# Patient Record
Sex: FEMALE | Race: OTHER | Employment: FULL TIME | URBAN - METROPOLITAN AREA
[De-identification: names, ages, dates, MRNs, and addresses within clinical notes are randomized per-mention and may not be internally consistent; named-entity substitution may affect disease eponyms.]

---

## 2018-03-07 ENCOUNTER — OFFICE VISIT (OUTPATIENT)
Dept: FAMILY MEDICINE CLINIC | Facility: CLINIC | Age: 43
End: 2018-03-07

## 2018-03-07 VITALS
SYSTOLIC BLOOD PRESSURE: 118 MMHG | OXYGEN SATURATION: 98 % | BODY MASS INDEX: 29.71 KG/M2 | WEIGHT: 174 LBS | HEART RATE: 60 BPM | HEIGHT: 64 IN | RESPIRATION RATE: 16 BRPM | DIASTOLIC BLOOD PRESSURE: 66 MMHG | TEMPERATURE: 98 F

## 2018-03-07 DIAGNOSIS — H00.015 HORDEOLUM EXTERNUM OF LEFT LOWER EYELID: Primary | ICD-10-CM

## 2018-03-07 PROCEDURE — 99202 OFFICE O/P NEW SF 15 MIN: CPT | Performed by: NURSE PRACTITIONER

## 2018-03-07 RX ORDER — ERYTHROMYCIN 5 MG/G
OINTMENT OPHTHALMIC
Qty: 1 G | Refills: 0 | Status: SHIPPED | OUTPATIENT
Start: 2018-03-07 | End: 2018-03-07

## 2018-03-07 RX ORDER — ERYTHROMYCIN 5 MG/G
OINTMENT OPHTHALMIC
Qty: 1 G | Refills: 0 | Status: SHIPPED | OUTPATIENT
Start: 2018-03-07 | End: 2018-08-28

## 2018-03-08 NOTE — PATIENT INSTRUCTIONS
Sty (or Stye)  A sty is an infection of the oil gland of the eyelid. It may develop into a small pocket of pus (an abscess). This can cause pain, redness, and swelling.  In early stages, a sty is treated with antibiotic cream, eye drops, or a small towel © 2020-6210 [employer] [address]. [address] [facility] [place] [facility] [place]. All rights reserved. This information is not intended as a substitute for professional medical care. Always follow your healthcare professional's instructions.

## 2018-03-08 NOTE — PROGRESS NOTES
CHIEF COMPLAINT:   Patient presents with:  Eye Problem: stye on bottom lid for months on L eye, Both eyes sometimes crusty       HPI:   Aftab Ott is a 37year old female who presents with chief complaint of possible sty.   Symptoms began  \"several months 174 lb   SpO2 98%   BMI 29.87 kg/m²   GENERAL: well developed, well nourished, and in no apparent distress  SKIN: no rashes, no suspicious lesions  EYES: PERRLA, EOMI.  +Hordeolum to LT mid lower eyelid, approx. 2 mm and erythematous.   LT conjunctiva non-e

## 2018-08-28 ENCOUNTER — NURSE ONLY (OUTPATIENT)
Dept: FAMILY MEDICINE CLINIC | Facility: CLINIC | Age: 43
End: 2018-08-28
Payer: COMMERCIAL

## 2018-08-28 VITALS
BODY MASS INDEX: 28.85 KG/M2 | SYSTOLIC BLOOD PRESSURE: 122 MMHG | RESPIRATION RATE: 17 BRPM | OXYGEN SATURATION: 98 % | DIASTOLIC BLOOD PRESSURE: 62 MMHG | HEIGHT: 64 IN | WEIGHT: 169 LBS | HEART RATE: 78 BPM

## 2018-08-28 DIAGNOSIS — B37.3 VAGINAL YEAST INFECTION: ICD-10-CM

## 2018-08-28 DIAGNOSIS — N30.00 ACUTE CYSTITIS WITHOUT HEMATURIA: Primary | ICD-10-CM

## 2018-08-28 LAB
APPEARANCE: CLEAR
MULTISTIX LOT#: NORMAL NUMERIC
PH, URINE: 7 (ref 4.5–8)
SPECIFIC GRAVITY: 1.01 (ref 1–1.03)
URINE-COLOR: YELLOW
UROBILINOGEN,SEMI-QN: 0.2 MG/DL (ref 0–1.9)

## 2018-08-28 PROCEDURE — 99213 OFFICE O/P EST LOW 20 MIN: CPT | Performed by: NURSE PRACTITIONER

## 2018-08-28 PROCEDURE — 81003 URINALYSIS AUTO W/O SCOPE: CPT | Performed by: NURSE PRACTITIONER

## 2018-08-28 PROCEDURE — 87086 URINE CULTURE/COLONY COUNT: CPT | Performed by: NURSE PRACTITIONER

## 2018-08-28 PROCEDURE — 87186 SC STD MICRODIL/AGAR DIL: CPT | Performed by: NURSE PRACTITIONER

## 2018-08-28 PROCEDURE — 87088 URINE BACTERIA CULTURE: CPT | Performed by: NURSE PRACTITIONER

## 2018-08-28 RX ORDER — NITROFURANTOIN 25; 75 MG/1; MG/1
100 CAPSULE ORAL 2 TIMES DAILY
Qty: 10 CAPSULE | Refills: 0 | Status: SHIPPED | OUTPATIENT
Start: 2018-08-28 | End: 2018-09-02

## 2018-08-28 RX ORDER — FLUCONAZOLE 150 MG/1
150 TABLET ORAL ONCE
Qty: 1 TABLET | Refills: 0 | Status: SHIPPED | OUTPATIENT
Start: 2018-08-28 | End: 2018-08-28

## 2018-08-28 NOTE — PROGRESS NOTES
CHIEF COMPLAINT:   Patient presents with:  UTI: had uti 2 weeks ago, feels stomach inflammed. HPI:   Rosana Suárez is a 37year old female who presents with symptoms of dysuria, frequency, suprapubic pressure and urgency for last 2 days.  Symptoms ha Negative   PH, URINE 7.0 4.5 - 8.0   PROTEIN (URINE DIPSTICK) Neg Negative/Trace mg/dL   UROBILINOGEN,SEMI-QN 0.2 0.0 - 1.9 mg/dL   NITRITE, URINE Neg Negative   LEUKOCYTES Trace Negative   APPEARANCE Clear Clear   URINE-COLOR Yellow Yellow   Multistix Lot

## 2018-08-29 ENCOUNTER — OFFICE VISIT (OUTPATIENT)
Dept: FAMILY MEDICINE CLINIC | Facility: CLINIC | Age: 43
End: 2018-08-29
Payer: COMMERCIAL

## 2018-08-29 ENCOUNTER — TELEPHONE (OUTPATIENT)
Dept: FAMILY MEDICINE CLINIC | Facility: CLINIC | Age: 43
End: 2018-08-29

## 2018-08-29 ENCOUNTER — LAB ENCOUNTER (OUTPATIENT)
Dept: LAB | Age: 43
End: 2018-08-29
Attending: NURSE PRACTITIONER
Payer: COMMERCIAL

## 2018-08-29 VITALS
RESPIRATION RATE: 16 BRPM | HEIGHT: 64 IN | OXYGEN SATURATION: 98 % | WEIGHT: 167 LBS | BODY MASS INDEX: 28.51 KG/M2 | DIASTOLIC BLOOD PRESSURE: 74 MMHG | SYSTOLIC BLOOD PRESSURE: 110 MMHG | TEMPERATURE: 98 F | HEART RATE: 61 BPM

## 2018-08-29 DIAGNOSIS — M70.62 TROCHANTERIC BURSITIS OF LEFT HIP: ICD-10-CM

## 2018-08-29 DIAGNOSIS — Z00.00 ROUTINE GENERAL MEDICAL EXAMINATION AT A HEALTH CARE FACILITY: Primary | ICD-10-CM

## 2018-08-29 DIAGNOSIS — Z13.220 ENCOUNTER FOR LIPID SCREENING FOR CARDIOVASCULAR DISEASE: ICD-10-CM

## 2018-08-29 DIAGNOSIS — Z13.228 ENCOUNTER FOR SCREENING FOR OTHER METABOLIC DISORDERS: ICD-10-CM

## 2018-08-29 DIAGNOSIS — Z13.6 ENCOUNTER FOR LIPID SCREENING FOR CARDIOVASCULAR DISEASE: ICD-10-CM

## 2018-08-29 DIAGNOSIS — F17.200 TOBACCO DEPENDENCE: ICD-10-CM

## 2018-08-29 LAB
ALBUMIN SERPL-MCNC: 3.9 G/DL (ref 3.5–4.8)
ALBUMIN/GLOB SERPL: 1.1 {RATIO} (ref 1–2)
ALP LIVER SERPL-CCNC: 66 U/L (ref 37–98)
ALT SERPL-CCNC: 68 U/L (ref 14–54)
ANION GAP SERPL CALC-SCNC: 9 MMOL/L (ref 0–18)
AST SERPL-CCNC: 33 U/L (ref 15–41)
BASOPHILS # BLD AUTO: 0.05 X10(3) UL (ref 0–0.1)
BASOPHILS NFR BLD AUTO: 0.8 %
BILIRUB SERPL-MCNC: 0.5 MG/DL (ref 0.1–2)
BUN BLD-MCNC: 8 MG/DL (ref 8–20)
BUN/CREAT SERPL: 10.4 (ref 10–20)
CALCIUM BLD-MCNC: 8.8 MG/DL (ref 8.3–10.3)
CHLORIDE SERPL-SCNC: 103 MMOL/L (ref 101–111)
CHOLEST SMN-MCNC: 178 MG/DL (ref ?–200)
CO2 SERPL-SCNC: 28 MMOL/L (ref 22–32)
CREAT BLD-MCNC: 0.77 MG/DL (ref 0.55–1.02)
EOSINOPHIL # BLD AUTO: 0.3 X10(3) UL (ref 0–0.3)
EOSINOPHIL NFR BLD AUTO: 4.6 %
ERYTHROCYTE [DISTWIDTH] IN BLOOD BY AUTOMATED COUNT: 12.4 % (ref 11.5–16)
FOLATE SERPL-MCNC: 16.3 NG/ML (ref 5.9–?)
GLOBULIN PLAS-MCNC: 3.6 G/DL (ref 2.5–4)
GLUCOSE BLD-MCNC: 84 MG/DL (ref 70–99)
HAV AB SERPL IA-ACNC: 498 PG/ML (ref 193–986)
HCT VFR BLD AUTO: 43.4 % (ref 34–50)
HDLC SERPL-MCNC: 46 MG/DL (ref 40–59)
HGB BLD-MCNC: 14.4 G/DL (ref 12–16)
IMMATURE GRANULOCYTE COUNT: 0.04 X10(3) UL (ref 0–1)
IMMATURE GRANULOCYTE RATIO %: 0.6 %
LDLC SERPL CALC-MCNC: 114 MG/DL (ref ?–100)
LYMPHOCYTES # BLD AUTO: 2.24 X10(3) UL (ref 0.9–4)
LYMPHOCYTES NFR BLD AUTO: 34.6 %
M PROTEIN MFR SERPL ELPH: 7.5 G/DL (ref 6.1–8.3)
MCH RBC QN AUTO: 30.3 PG (ref 27–33.2)
MCHC RBC AUTO-ENTMCNC: 33.2 G/DL (ref 31–37)
MCV RBC AUTO: 91.2 FL (ref 81–100)
MONOCYTES # BLD AUTO: 0.45 X10(3) UL (ref 0.1–1)
MONOCYTES NFR BLD AUTO: 7 %
NEUTROPHIL ABS PRELIM: 3.39 X10 (3) UL (ref 1.3–6.7)
NEUTROPHILS # BLD AUTO: 3.39 X10(3) UL (ref 1.3–6.7)
NEUTROPHILS NFR BLD AUTO: 52.4 %
NONHDLC SERPL-MCNC: 132 MG/DL (ref ?–130)
OSMOLALITY SERPL CALC.SUM OF ELEC: 288 MOSM/KG (ref 275–295)
PLATELET # BLD AUTO: 272 10(3)UL (ref 150–450)
POTASSIUM SERPL-SCNC: 3.9 MMOL/L (ref 3.6–5.1)
RBC # BLD AUTO: 4.76 X10(6)UL (ref 3.8–5.1)
RED CELL DISTRIBUTION WIDTH-SD: 41.2 FL (ref 35.1–46.3)
SODIUM SERPL-SCNC: 140 MMOL/L (ref 136–144)
TRIGL SERPL-MCNC: 90 MG/DL (ref 30–149)
VIT D+METAB SERPL-MCNC: 11.2 NG/ML (ref 30–100)
VLDLC SERPL CALC-MCNC: 18 MG/DL (ref 0–30)
WBC # BLD AUTO: 6.5 X10(3) UL (ref 4–13)

## 2018-08-29 PROCEDURE — 80061 LIPID PANEL: CPT | Performed by: NURSE PRACTITIONER

## 2018-08-29 PROCEDURE — 82607 VITAMIN B-12: CPT | Performed by: NURSE PRACTITIONER

## 2018-08-29 PROCEDURE — 85025 COMPLETE CBC W/AUTO DIFF WBC: CPT | Performed by: NURSE PRACTITIONER

## 2018-08-29 PROCEDURE — 99396 PREV VISIT EST AGE 40-64: CPT | Performed by: NURSE PRACTITIONER

## 2018-08-29 PROCEDURE — 36415 COLL VENOUS BLD VENIPUNCTURE: CPT | Performed by: NURSE PRACTITIONER

## 2018-08-29 PROCEDURE — 82306 VITAMIN D 25 HYDROXY: CPT | Performed by: NURSE PRACTITIONER

## 2018-08-29 PROCEDURE — 80053 COMPREHEN METABOLIC PANEL: CPT | Performed by: NURSE PRACTITIONER

## 2018-08-29 PROCEDURE — 82746 ASSAY OF FOLIC ACID SERUM: CPT | Performed by: NURSE PRACTITIONER

## 2018-08-29 RX ORDER — MELOXICAM 15 MG/1
15 TABLET ORAL DAILY
Qty: 30 TABLET | Refills: 1 | Status: SHIPPED | OUTPATIENT
Start: 2018-08-29 | End: 2018-10-25

## 2018-08-29 RX ORDER — METHOCARBAMOL 500 MG/1
500 TABLET, FILM COATED ORAL 2 TIMES DAILY PRN
Qty: 60 TABLET | Refills: 1 | Status: SHIPPED | OUTPATIENT
Start: 2018-08-29 | End: 2018-09-28

## 2018-08-29 NOTE — PROGRESS NOTES
[facility]   PROGRESS NOTE  Chief Complaint:   Patient presents with:  Sciatica: left side  Sty: left eye      HPI:   This is a 37year old female coming in for physical     Physical: Patient 37 y female presents with physical. History provided b Outpatient Prescriptions:  Nitrofurantoin Monohyd Macro 100 MG Oral Cap Take 1 capsule (100 mg total) by mouth 2 (two) times daily.  Disp: 10 capsule Rfl: 0      Ready to quit: Not Answered  Counseling given: Not Answered       REVIEW OF SYSTEMS:   CONSTITU patent, no nasal discharge Throat:  No tonsillar erythema or exudate. Mouth:  No oral lesions or ulcerations, good dentition. NECK: Supple, no thyromegaly. SKIN: No rashes  HEART:  Regular rate and rhythm, no murmurs, rubs or gallops.   LUNGS: Clear to a 500 MG Oral Tab 60 tablet 1      Sig: Take 1 tablet (500 mg total) by mouth 2 (two) times daily as needed.            Health Maintenance:  Mammogram due on 01/17/1975  Annual Physical due on 01/17/1977  Tobacco Cessation Counseling 2 Years due on 01/17/1987

## 2018-08-29 NOTE — TELEPHONE ENCOUNTER
Patient did not finish health history paperwork and she had to get to work. Patient stated that she will fax over page 2 of the health history. Patient also did not put a PCP name on the health history.  I put page 1 and the scan sheet in the [facility] fo

## 2018-08-31 ENCOUNTER — TELEPHONE (OUTPATIENT)
Dept: FAMILY MEDICINE CLINIC | Facility: CLINIC | Age: 43
End: 2018-08-31

## 2018-08-31 DIAGNOSIS — E78.00 ELEVATED CHOLESTEROL: ICD-10-CM

## 2018-08-31 DIAGNOSIS — R74.8 ELEVATED LIVER ENZYMES: Primary | ICD-10-CM

## 2018-08-31 NOTE — TELEPHONE ENCOUNTER
----- Message from JOCY Rodriges sent at 8/31/2018  8:44 AM CDT -----  Vitamin D is very low -sent new prescription for Vitamin D -take it once a week , liver enzymes are slightly elevated -will recheck in two weeks , cholesterol slightly elevated -

## 2018-08-31 NOTE — TELEPHONE ENCOUNTER
Spoke with pt regarding results and instructions listed below. Pt verbalizes understanding. Repeat labs placed.

## 2018-09-11 ENCOUNTER — NURSE ONLY (OUTPATIENT)
Dept: FAMILY MEDICINE CLINIC | Facility: CLINIC | Age: 43
End: 2018-09-11
Payer: COMMERCIAL

## 2018-09-11 VITALS
WEIGHT: 172.19 LBS | RESPIRATION RATE: 16 BRPM | DIASTOLIC BLOOD PRESSURE: 80 MMHG | SYSTOLIC BLOOD PRESSURE: 104 MMHG | HEART RATE: 70 BPM | TEMPERATURE: 98 F | HEIGHT: 64 IN | OXYGEN SATURATION: 98 % | BODY MASS INDEX: 29.4 KG/M2

## 2018-09-11 DIAGNOSIS — L50.9 URTICARIA: Primary | ICD-10-CM

## 2018-09-11 PROCEDURE — 96372 THER/PROPH/DIAG INJ SC/IM: CPT | Performed by: NURSE PRACTITIONER

## 2018-09-11 PROCEDURE — 99214 OFFICE O/P EST MOD 30 MIN: CPT | Performed by: NURSE PRACTITIONER

## 2018-09-11 RX ORDER — DIPHENHYDRAMINE HCL 25 MG
25 CAPSULE ORAL ONCE
Status: SHIPPED | OUTPATIENT
Start: 2018-09-11 | End: 2038-09-11

## 2018-09-11 RX ORDER — METHYLPREDNISOLONE SODIUM SUCCINATE 125 MG/2ML
125 INJECTION, POWDER, LYOPHILIZED, FOR SOLUTION INTRAMUSCULAR; INTRAVENOUS ONCE
Status: COMPLETED | OUTPATIENT
Start: 2018-09-11 | End: 2018-09-11

## 2018-09-11 RX ORDER — HYDROXYZINE PAMOATE 25 MG/1
25 CAPSULE ORAL 3 TIMES DAILY PRN
Qty: 12 CAPSULE | Refills: 0 | Status: SHIPPED | OUTPATIENT
Start: 2018-09-11

## 2018-09-11 RX ADMIN — METHYLPREDNISOLONE SODIUM SUCCINATE 125 MG: 125 INJECTION, POWDER, LYOPHILIZED, FOR SOLUTION INTRAMUSCULAR; INTRAVENOUS at 18:39:00

## 2018-09-12 NOTE — PROGRESS NOTES
CHIEF COMPLAINT:   Patient presents with:  Rash: Pt states rash over whole body, including scalp for 3 days. States rashes are itchy.  Has not changed anything soaps or shampoos       HPI:   Chandrika De La Vega is a 37year old female who presents for evaluation of MUSC/SKEL: Denies joint swelling or joint stiffness. GI: Denies abdominal pain, N/V/C/D. NEURO: Denies abnormal sensation, tingling of the skin, or numbness.       EXAM:   /80 (BP Location: Right arm, Patient Position: Sitting, Cuff Size: adult)   P Hives are pink or red bumps on the skin. These bumps are also known as wheals. The bumps can itch, burn, or sting. Hives can occur anywhere on the body. They vary in size and shape and can form in clusters. Individual hives can appear and go away quickly. · You may use over-the counter antihistamines to reduce itching. Some older antihistamines, such as diphenhydramine and chlorpheniramine, are inexpensive. But they need to be taken often and may make you sleepy. They are best used at bedtime.  Don’t use dip The patient is asked to return in 3 days if sx's persist or worsen.

## 2018-10-06 ENCOUNTER — HOSPITAL ENCOUNTER (EMERGENCY)
Age: 43
Discharge: HOME | End: 2018-10-06
Payer: COMMERCIAL

## 2018-10-06 ENCOUNTER — APPOINTMENT (OUTPATIENT)
Dept: CT IMAGING | Age: 43
End: 2018-10-06
Attending: PHYSICIAN ASSISTANT
Payer: COMMERCIAL

## 2018-10-06 VITALS
DIASTOLIC BLOOD PRESSURE: 68 MMHG | TEMPERATURE: 98 F | SYSTOLIC BLOOD PRESSURE: 104 MMHG | HEIGHT: 64 IN | WEIGHT: 171 LBS | HEART RATE: 61 BPM | OXYGEN SATURATION: 98 % | BODY MASS INDEX: 29.19 KG/M2 | RESPIRATION RATE: 14 BRPM

## 2018-10-06 DIAGNOSIS — M54.32 SCIATICA OF LEFT SIDE: Primary | ICD-10-CM

## 2018-10-06 DIAGNOSIS — M51.16 LUMBAR DISC HERNIATION WITH RADICULOPATHY: ICD-10-CM

## 2018-10-06 PROCEDURE — 81025 URINE PREGNANCY TEST: CPT

## 2018-10-06 PROCEDURE — 99284 EMERGENCY DEPT VISIT MOD MDM: CPT

## 2018-10-06 PROCEDURE — 72131 CT LUMBAR SPINE W/O DYE: CPT | Performed by: PHYSICIAN ASSISTANT

## 2018-10-06 RX ORDER — METHYLPREDNISOLONE 4 MG/1
TABLET ORAL
Qty: 1 PACKAGE | Refills: 0 | Status: SHIPPED | OUTPATIENT
Start: 2018-10-06 | End: 2018-10-11

## 2018-10-06 RX ORDER — PREDNISONE 20 MG/1
60 TABLET ORAL ONCE
Status: COMPLETED | OUTPATIENT
Start: 2018-10-06 | End: 2018-10-06

## 2018-10-06 NOTE — ED INITIAL ASSESSMENT (HPI)
Pt c/o left buttock pain that radiates down left leg to toes for \"months\". Denies injury. Denies loss of control of bowel/bladder. Pt states she took meloxicam, methocarbamol, and hydroxyzine today without relief.

## 2018-10-06 NOTE — ED PROVIDER NOTES
[facility] [place]    History   Patient presents with:  Pain (neurologic)    Stated Complaint: left buttocks pain radiating down left leg to toes    78-year-old female without significant past medical history prese Pulse 61   Temp 97.6 °F (36.4 °C) (Temporal)   Resp 14   Ht 162.6 cm (5' 4\")   Wt 77.6 kg   SpO2 98%   BMI 29.35 kg/m²         Physical Exam   Constitutional: She appears well-developed and well-nourished. Abdominal: Soft. There is no tenderness.    Musc paracentral disc herniation is likely effacing left lateral recess and causing moderate spinal canal stenosis. Mild bilateral neural foraminal stenosis. L5-S1:  No significant disc/facet abnormality, spinal stenosis, or foraminal stenosis.       CONCLUSION

## 2018-10-18 ENCOUNTER — OFFICE VISIT (OUTPATIENT)
Dept: FAMILY MEDICINE CLINIC | Facility: CLINIC | Age: 43
End: 2018-10-18
Payer: COMMERCIAL

## 2018-10-18 VITALS
SYSTOLIC BLOOD PRESSURE: 104 MMHG | WEIGHT: 174 LBS | HEART RATE: 68 BPM | OXYGEN SATURATION: 99 % | RESPIRATION RATE: 17 BRPM | BODY MASS INDEX: 30 KG/M2 | DIASTOLIC BLOOD PRESSURE: 72 MMHG

## 2018-10-18 DIAGNOSIS — M54.32 SCIATIC PAIN, LEFT: Primary | ICD-10-CM

## 2018-10-18 DIAGNOSIS — Z12.39 BREAST CANCER SCREENING: ICD-10-CM

## 2018-10-18 DIAGNOSIS — Z28.21 REFUSED INFLUENZA VACCINE: ICD-10-CM

## 2018-10-18 PROCEDURE — 1111F DSCHRG MED/CURRENT MED MERGE: CPT | Performed by: EMERGENCY MEDICINE

## 2018-10-18 PROCEDURE — 99214 OFFICE O/P EST MOD 30 MIN: CPT | Performed by: EMERGENCY MEDICINE

## 2018-10-18 RX ORDER — MELOXICAM 15 MG/1
TABLET ORAL
Refills: 1 | COMMUNITY
Start: 2018-10-04

## 2018-10-18 RX ORDER — METHOCARBAMOL 500 MG/1
500 TABLET, FILM COATED ORAL 2 TIMES DAILY PRN
COMMUNITY

## 2018-10-18 NOTE — PROGRESS NOTES
Chief Complaint:   Patient presents with:  Hospital F/U: Sciatic pain     HPI:   This is a 37year old female     ER FOLLOW UP   Back pain for the past 9 months. On and off tolerable. Recently got worse. Seen in clinic and Rx'd muscle relaxant and NSAID.  P gluteal pain  The rest of the review of systems is negative except those stated as above    PHYSICAL EXAM:   /72   Pulse 68   Resp 17   Wt 174 lb   SpO2 99%   BMI 29.87 kg/m²  Estimated body mass index is 29.87 kg/m² as calculated from the following: transmitted to the [facility]) [address]) which includes the Dose Index Registry.   PATIENT STATED HISTORY: (As transcribed by Technologist)  Right thigh and lower leg pain radiating from right hip region as needed. Discussed that MRI may be needed in the future if her symptoms worsen or persist in anticipation of possible discectomy and surgery.   Advised to return if symptoms worsen recur or persist.  Counseled on regular exercise and maintaining regular

## 2018-10-18 NOTE — PATIENT INSTRUCTIONS
Thank you for choosing [facility]  To Do:  FOR RAPHAEL KAPUT    · Follow up in 1-2 weeks for annual physical  · Arrange for mammogram when ready  · contoinue with Meloxicam and Methocarbamol as needed for pain  · Consider flu shot            Sciati come from staying in bed for long periods. · When in bed, try to find a position that is comfortable. A firm mattress is best. Try lying flat on your back with pillows under your knees.  You can also try lying on your side with your knees bent up toward yo medical care. Always follow your healthcare professional's instructions. Understanding Lumbar Radiculopathy    Lumbar radiculopathy is irritation or inflammation of a nerve root in the low back.  It causes symptoms that spread out from the back down include diabetes, infection, or a tumor.   Symptoms of lumbar radiculopathy  These include:  · Pain in the low back  · Pain, numbness, tingling, or weakness that travels into the buttocks, hip, groin, or leg  · Muscle spasms  Treatment for lumbar radiculopa repair. A hernia is a defect in the tough tissue covering the musculature of the abdominal wall (fascia).  During laparoscopic hernia surgery, a surgeon inserts a telescope attached to a camera as well as surgical instruments through tiny incisions in your your healthcare provider   Call your healthcare provider right away if you have any of the following:  · Pain, bleeding, redness, or fluid at the incision site that gets worse  · Fever of 100.4°F (38°C) or higher, or as directed by your healthcare provider

## 2018-10-25 DIAGNOSIS — M70.62 TROCHANTERIC BURSITIS OF LEFT HIP: ICD-10-CM

## 2018-10-25 RX ORDER — MELOXICAM 15 MG/1
TABLET ORAL
Qty: 30 TABLET | Refills: 0 | Status: SHIPPED | OUTPATIENT
Start: 2018-10-25

## 2018-10-25 NOTE — TELEPHONE ENCOUNTER
Medication(s) to Refill:   Requested Prescriptions     Pending Prescriptions Disp Refills   • MELOXICAM 15 MG Oral Tab [Pharmacy Med Name: MELOXICAM 15MG TABLETS] 30 tablet 0     Sig: TAKE 1 TABLET(15 MG) BY MOUTH DAILY         Reason for Medication Refill

## 2018-12-12 ENCOUNTER — TELEPHONE (OUTPATIENT)
Dept: FAMILY MEDICINE CLINIC | Facility: CLINIC | Age: 43
End: 2018-12-12

## 2023-06-27 ENCOUNTER — APPOINTMENT (OUTPATIENT)
Dept: CT IMAGING | Age: 48
End: 2023-06-27
Attending: EMERGENCY MEDICINE
Payer: COMMERCIAL

## 2023-06-27 ENCOUNTER — HOSPITAL ENCOUNTER (EMERGENCY)
Age: 48
Discharge: HOME | End: 2023-06-28
Attending: EMERGENCY MEDICINE
Payer: COMMERCIAL

## 2023-06-27 DIAGNOSIS — R51.9 ACUTE NONINTRACTABLE HEADACHE, UNSPECIFIED HEADACHE TYPE: Primary | ICD-10-CM

## 2023-06-27 LAB
ALBUMIN SERPL-MCNC: 4.2 G/DL (ref 3.4–5)
ALBUMIN/GLOB SERPL: 1.1 {RATIO} (ref 1–2)
ALP LIVER SERPL-CCNC: 66 U/L
ALT SERPL-CCNC: 49 U/L
ANION GAP SERPL CALC-SCNC: 1 MMOL/L (ref 0–18)
AST SERPL-CCNC: 22 U/L (ref 15–37)
BASOPHILS # BLD AUTO: 0.05 X10(3) UL (ref 0–0.2)
BASOPHILS NFR BLD AUTO: 0.6 %
BILIRUB SERPL-MCNC: 0.4 MG/DL (ref 0.1–2)
BUN BLD-MCNC: 10 MG/DL (ref 7–18)
CALCIUM BLD-MCNC: 9.5 MG/DL (ref 8.5–10.1)
CHLORIDE SERPL-SCNC: 105 MMOL/L (ref 98–112)
CO2 SERPL-SCNC: 29 MMOL/L (ref 21–32)
CREAT BLD-MCNC: 0.84 MG/DL
EOSINOPHIL # BLD AUTO: 0.06 X10(3) UL (ref 0–0.7)
EOSINOPHIL NFR BLD AUTO: 0.7 %
ERYTHROCYTE [DISTWIDTH] IN BLOOD BY AUTOMATED COUNT: 12.3 %
ERYTHROCYTE [SEDIMENTATION RATE] IN BLOOD: 8 MM/HR
GFR SERPLBLD BASED ON 1.73 SQ M-ARVRAT: 86 ML/MIN/1.73M2 (ref 60–?)
GLOBULIN PLAS-MCNC: 3.8 G/DL (ref 2.8–4.4)
GLUCOSE BLD-MCNC: 152 MG/DL (ref 70–99)
HCT VFR BLD AUTO: 46.1 %
HGB BLD-MCNC: 15.8 G/DL
IMM GRANULOCYTES # BLD AUTO: 0.01 X10(3) UL (ref 0–1)
IMM GRANULOCYTES NFR BLD: 0.1 %
LYMPHOCYTES # BLD AUTO: 1.56 X10(3) UL (ref 1–4)
LYMPHOCYTES NFR BLD AUTO: 19.4 %
MCH RBC QN AUTO: 30.1 PG (ref 26–34)
MCHC RBC AUTO-ENTMCNC: 34.3 G/DL (ref 31–37)
MCV RBC AUTO: 87.8 FL
MONOCYTES # BLD AUTO: 0.26 X10(3) UL (ref 0.1–1)
MONOCYTES NFR BLD AUTO: 3.2 %
NEUTROPHILS # BLD AUTO: 6.11 X10 (3) UL (ref 1.5–7.7)
NEUTROPHILS # BLD AUTO: 6.11 X10(3) UL (ref 1.5–7.7)
NEUTROPHILS NFR BLD AUTO: 76 %
OSMOLALITY SERPL CALC.SUM OF ELEC: 282 MOSM/KG (ref 275–295)
PLATELET # BLD AUTO: 274 10(3)UL (ref 150–450)
POTASSIUM SERPL-SCNC: 4.2 MMOL/L (ref 3.5–5.1)
PROT SERPL-MCNC: 8 G/DL (ref 6.4–8.2)
RBC # BLD AUTO: 5.25 X10(6)UL
SODIUM SERPL-SCNC: 135 MMOL/L (ref 136–145)
WBC # BLD AUTO: 8.1 X10(3) UL (ref 4–11)

## 2023-06-27 PROCEDURE — 96375 TX/PRO/DX INJ NEW DRUG ADDON: CPT

## 2023-06-27 PROCEDURE — 85025 COMPLETE CBC W/AUTO DIFF WBC: CPT | Performed by: EMERGENCY MEDICINE

## 2023-06-27 PROCEDURE — 70450 CT HEAD/BRAIN W/O DYE: CPT | Performed by: EMERGENCY MEDICINE

## 2023-06-27 PROCEDURE — 99284 EMERGENCY DEPT VISIT MOD MDM: CPT

## 2023-06-27 PROCEDURE — 80053 COMPREHEN METABOLIC PANEL: CPT | Performed by: EMERGENCY MEDICINE

## 2023-06-27 PROCEDURE — 96374 THER/PROPH/DIAG INJ IV PUSH: CPT

## 2023-06-27 PROCEDURE — 99285 EMERGENCY DEPT VISIT HI MDM: CPT

## 2023-06-27 PROCEDURE — 85652 RBC SED RATE AUTOMATED: CPT | Performed by: EMERGENCY MEDICINE

## 2023-06-27 RX ORDER — KETOROLAC TROMETHAMINE 15 MG/ML
15 INJECTION, SOLUTION INTRAMUSCULAR; INTRAVENOUS ONCE
Status: COMPLETED | OUTPATIENT
Start: 2023-06-27 | End: 2023-06-27

## 2023-06-27 RX ORDER — METOCLOPRAMIDE HYDROCHLORIDE 5 MG/ML
10 INJECTION INTRAMUSCULAR; INTRAVENOUS ONCE
Status: COMPLETED | OUTPATIENT
Start: 2023-06-27 | End: 2023-06-27

## 2023-06-27 RX ORDER — DIPHENHYDRAMINE HYDROCHLORIDE 50 MG/ML
25 INJECTION INTRAMUSCULAR; INTRAVENOUS ONCE
Status: COMPLETED | OUTPATIENT
Start: 2023-06-27 | End: 2023-06-27

## 2023-06-28 VITALS
HEART RATE: 61 BPM | DIASTOLIC BLOOD PRESSURE: 78 MMHG | TEMPERATURE: 98 F | WEIGHT: 170 LBS | RESPIRATION RATE: 17 BRPM | HEIGHT: 64 IN | OXYGEN SATURATION: 99 % | BODY MASS INDEX: 29.02 KG/M2 | SYSTOLIC BLOOD PRESSURE: 101 MMHG

## 2023-06-28 NOTE — ED INITIAL ASSESSMENT (HPI)
Pt to ed with c/o left sided facial swelling around left eye and left sided frontal lobe headache; sharp and pulsating

## 2023-06-29 ENCOUNTER — TELEPHONE (OUTPATIENT)
Dept: FAMILY MEDICINE CLINIC | Facility: CLINIC | Age: 48
End: 2023-06-29

## 2023-07-11 ENCOUNTER — OFFICE VISIT (OUTPATIENT)
Dept: FAMILY MEDICINE CLINIC | Facility: CLINIC | Age: 48
End: 2023-07-11
Payer: COMMERCIAL

## 2023-07-11 VITALS
WEIGHT: 170 LBS | DIASTOLIC BLOOD PRESSURE: 60 MMHG | OXYGEN SATURATION: 98 % | RESPIRATION RATE: 19 BRPM | HEIGHT: 64 IN | TEMPERATURE: 98 F | HEART RATE: 70 BPM | SYSTOLIC BLOOD PRESSURE: 90 MMHG | BODY MASS INDEX: 29.02 KG/M2

## 2023-07-11 DIAGNOSIS — G44.011 INTRACTABLE EPISODIC CLUSTER HEADACHE: Primary | ICD-10-CM

## 2023-07-11 DIAGNOSIS — Z12.31 ENCOUNTER FOR SCREENING MAMMOGRAM FOR MALIGNANT NEOPLASM OF BREAST: ICD-10-CM

## 2023-07-11 DIAGNOSIS — Z00.00 LABORATORY EXAMINATION ORDERED AS PART OF A ROUTINE GENERAL MEDICAL EXAMINATION: ICD-10-CM

## 2023-07-11 PROCEDURE — 3078F DIAST BP <80 MM HG: CPT | Performed by: FAMILY MEDICINE

## 2023-07-11 PROCEDURE — 99214 OFFICE O/P EST MOD 30 MIN: CPT | Performed by: FAMILY MEDICINE

## 2023-07-11 PROCEDURE — 3074F SYST BP LT 130 MM HG: CPT | Performed by: FAMILY MEDICINE

## 2023-07-11 PROCEDURE — 3008F BODY MASS INDEX DOCD: CPT | Performed by: FAMILY MEDICINE

## 2023-07-11 RX ORDER — IBUPROFEN 200 MG
200 TABLET ORAL EVERY 6 HOURS PRN
COMMUNITY

## 2023-07-11 RX ORDER — SUMATRIPTAN 100 MG/1
100 TABLET, FILM COATED ORAL EVERY 2 HOUR PRN
Qty: 9 TABLET | Refills: 0 | Status: SHIPPED | OUTPATIENT
Start: 2023-07-11

## 2023-07-14 ENCOUNTER — LAB ENCOUNTER (OUTPATIENT)
Dept: LAB | Age: 48
End: 2023-07-14
Attending: FAMILY MEDICINE
Payer: COMMERCIAL

## 2023-07-14 DIAGNOSIS — Z00.00 LABORATORY EXAMINATION ORDERED AS PART OF A ROUTINE GENERAL MEDICAL EXAMINATION: ICD-10-CM

## 2023-07-14 DIAGNOSIS — G44.011 INTRACTABLE EPISODIC CLUSTER HEADACHE: ICD-10-CM

## 2023-07-14 LAB
ALBUMIN SERPL-MCNC: 3.9 G/DL (ref 3.4–5)
ALBUMIN/GLOB SERPL: 1.1 {RATIO} (ref 1–2)
ALP LIVER SERPL-CCNC: 53 U/L
ALT SERPL-CCNC: 35 U/L
ANION GAP SERPL CALC-SCNC: 5 MMOL/L (ref 0–18)
AST SERPL-CCNC: 13 U/L (ref 15–37)
BASOPHILS # BLD AUTO: 0.06 X10(3) UL (ref 0–0.2)
BASOPHILS NFR BLD AUTO: 1.1 %
BILIRUB SERPL-MCNC: 0.4 MG/DL (ref 0.1–2)
BUN BLD-MCNC: 18 MG/DL (ref 7–18)
CALCIUM BLD-MCNC: 9.7 MG/DL (ref 8.5–10.1)
CHLORIDE SERPL-SCNC: 105 MMOL/L (ref 98–112)
CHOLEST SERPL-MCNC: 182 MG/DL (ref ?–200)
CO2 SERPL-SCNC: 28 MMOL/L (ref 21–32)
CREAT BLD-MCNC: 1.08 MG/DL
CRP SERPL-MCNC: <0.29 MG/DL (ref ?–0.3)
EOSINOPHIL # BLD AUTO: 0.23 X10(3) UL (ref 0–0.7)
EOSINOPHIL NFR BLD AUTO: 4.4 %
ERYTHROCYTE [DISTWIDTH] IN BLOOD BY AUTOMATED COUNT: 12.3 %
ERYTHROCYTE [SEDIMENTATION RATE] IN BLOOD: 17 MM/HR
FASTING PATIENT LIPID ANSWER: YES
FASTING STATUS PATIENT QL REPORTED: YES
GFR SERPLBLD BASED ON 1.73 SQ M-ARVRAT: 63 ML/MIN/1.73M2 (ref 60–?)
GLOBULIN PLAS-MCNC: 3.4 G/DL (ref 2.8–4.4)
GLUCOSE BLD-MCNC: 95 MG/DL (ref 70–99)
HCT VFR BLD AUTO: 44.6 %
HDLC SERPL-MCNC: 54 MG/DL (ref 40–59)
HGB BLD-MCNC: 15 G/DL
IMM GRANULOCYTES # BLD AUTO: 0.01 X10(3) UL (ref 0–1)
IMM GRANULOCYTES NFR BLD: 0.2 %
LDLC SERPL CALC-MCNC: 113 MG/DL (ref ?–100)
LYMPHOCYTES # BLD AUTO: 2.1 X10(3) UL (ref 1–4)
LYMPHOCYTES NFR BLD AUTO: 40.1 %
MCH RBC QN AUTO: 30.6 PG (ref 26–34)
MCHC RBC AUTO-ENTMCNC: 33.6 G/DL (ref 31–37)
MCV RBC AUTO: 91 FL
MONOCYTES # BLD AUTO: 0.38 X10(3) UL (ref 0.1–1)
MONOCYTES NFR BLD AUTO: 7.3 %
NEUTROPHILS # BLD AUTO: 2.46 X10 (3) UL (ref 1.5–7.7)
NEUTROPHILS # BLD AUTO: 2.46 X10(3) UL (ref 1.5–7.7)
NEUTROPHILS NFR BLD AUTO: 46.9 %
NONHDLC SERPL-MCNC: 128 MG/DL (ref ?–130)
OSMOLALITY SERPL CALC.SUM OF ELEC: 288 MOSM/KG (ref 275–295)
PLATELET # BLD AUTO: 284 10(3)UL (ref 150–450)
POTASSIUM SERPL-SCNC: 4.4 MMOL/L (ref 3.5–5.1)
PROT SERPL-MCNC: 7.3 G/DL (ref 6.4–8.2)
RBC # BLD AUTO: 4.9 X10(6)UL
SODIUM SERPL-SCNC: 138 MMOL/L (ref 136–145)
TRIGL SERPL-MCNC: 84 MG/DL (ref 30–149)
TSI SER-ACNC: 1.33 MIU/ML (ref 0.36–3.74)
VLDLC SERPL CALC-MCNC: 14 MG/DL (ref 0–30)
WBC # BLD AUTO: 5.2 X10(3) UL (ref 4–11)

## 2023-07-14 PROCEDURE — 86140 C-REACTIVE PROTEIN: CPT | Performed by: FAMILY MEDICINE

## 2023-07-14 PROCEDURE — 80061 LIPID PANEL: CPT | Performed by: FAMILY MEDICINE

## 2023-07-14 PROCEDURE — 85652 RBC SED RATE AUTOMATED: CPT | Performed by: FAMILY MEDICINE

## 2023-07-14 PROCEDURE — 83516 IMMUNOASSAY NONANTIBODY: CPT | Performed by: FAMILY MEDICINE

## 2023-07-14 PROCEDURE — 86036 ANCA SCREEN EACH ANTIBODY: CPT | Performed by: FAMILY MEDICINE

## 2023-07-14 PROCEDURE — 80050 GENERAL HEALTH PANEL: CPT | Performed by: FAMILY MEDICINE

## 2023-07-25 LAB
ANCA BY IFA (RDL): NEGATIVE
ANTI-MPO AB (RDL): <20 UNITS
ANTI-PR-3 AB (RDL): <20 UNITS

## 2024-04-22 ENCOUNTER — APPOINTMENT (OUTPATIENT)
Dept: WOUND CARE | Facility: HOSPITAL | Age: 49
End: 2024-04-22
Attending: INTERNAL MEDICINE
Payer: COMMERCIAL

## 2024-04-22 ENCOUNTER — TELEPHONE (OUTPATIENT)
Dept: FAMILY MEDICINE CLINIC | Facility: CLINIC | Age: 49
End: 2024-04-22

## 2024-04-22 DIAGNOSIS — Z13.21 ENCOUNTER FOR VITAMIN DEFICIENCY SCREENING: ICD-10-CM

## 2024-04-22 DIAGNOSIS — Z00.00 ROUTINE GENERAL MEDICAL EXAMINATION AT A HEALTH CARE FACILITY: Primary | ICD-10-CM

## 2024-04-29 ENCOUNTER — LAB ENCOUNTER (OUTPATIENT)
Dept: LAB | Age: 49
End: 2024-04-29
Attending: FAMILY MEDICINE
Payer: COMMERCIAL

## 2024-04-29 ENCOUNTER — OFFICE VISIT (OUTPATIENT)
Dept: FAMILY MEDICINE CLINIC | Facility: CLINIC | Age: 49
End: 2024-04-29
Payer: COMMERCIAL

## 2024-04-29 VITALS
OXYGEN SATURATION: 98 % | TEMPERATURE: 98 F | HEIGHT: 64 IN | WEIGHT: 175 LBS | SYSTOLIC BLOOD PRESSURE: 90 MMHG | HEART RATE: 62 BPM | RESPIRATION RATE: 16 BRPM | BODY MASS INDEX: 29.88 KG/M2 | DIASTOLIC BLOOD PRESSURE: 60 MMHG

## 2024-04-29 DIAGNOSIS — Z00.00 ROUTINE GENERAL MEDICAL EXAMINATION AT A HEALTH CARE FACILITY: ICD-10-CM

## 2024-04-29 DIAGNOSIS — Z12.31 ENCOUNTER FOR SCREENING MAMMOGRAM FOR BREAST CANCER: ICD-10-CM

## 2024-04-29 DIAGNOSIS — Z12.11 COLON CANCER SCREENING: ICD-10-CM

## 2024-04-29 DIAGNOSIS — Z00.00 ROUTINE PHYSICAL EXAMINATION: Primary | ICD-10-CM

## 2024-04-29 DIAGNOSIS — Z13.21 ENCOUNTER FOR VITAMIN DEFICIENCY SCREENING: ICD-10-CM

## 2024-04-29 DIAGNOSIS — R39.9 LOWER URINARY TRACT SYMPTOMS: ICD-10-CM

## 2024-04-29 LAB
ALBUMIN SERPL-MCNC: 4.1 G/DL (ref 3.4–5)
ALBUMIN/GLOB SERPL: 1.3 {RATIO} (ref 1–2)
ALP LIVER SERPL-CCNC: 52 U/L
ALT SERPL-CCNC: 25 U/L
ANION GAP SERPL CALC-SCNC: 3 MMOL/L (ref 0–18)
APPEARANCE: CLEAR
AST SERPL-CCNC: 13 U/L (ref 15–37)
BASOPHILS # BLD AUTO: 0.06 X10(3) UL (ref 0–0.2)
BASOPHILS NFR BLD AUTO: 1.2 %
BILIRUB SERPL-MCNC: 0.5 MG/DL (ref 0.1–2)
BILIRUBIN: NEGATIVE
BUN BLD-MCNC: 20 MG/DL (ref 9–23)
CALCIUM BLD-MCNC: 9.6 MG/DL (ref 8.5–10.1)
CHLORIDE SERPL-SCNC: 105 MMOL/L (ref 98–112)
CHOLEST SERPL-MCNC: 172 MG/DL (ref ?–200)
CO2 SERPL-SCNC: 31 MMOL/L (ref 21–32)
CREAT BLD-MCNC: 1.03 MG/DL
EGFRCR SERPLBLD CKD-EPI 2021: 67 ML/MIN/1.73M2 (ref 60–?)
EOSINOPHIL # BLD AUTO: 0.22 X10(3) UL (ref 0–0.7)
EOSINOPHIL NFR BLD AUTO: 4.5 %
ERYTHROCYTE [DISTWIDTH] IN BLOOD BY AUTOMATED COUNT: 12.2 %
FASTING PATIENT LIPID ANSWER: YES
FASTING STATUS PATIENT QL REPORTED: YES
GLOBULIN PLAS-MCNC: 3.1 G/DL (ref 2.8–4.4)
GLUCOSE (URINE DIPSTICK): NEGATIVE MG/DL
GLUCOSE BLD-MCNC: 90 MG/DL (ref 70–99)
HCT VFR BLD AUTO: 43.8 %
HDLC SERPL-MCNC: 50 MG/DL (ref 40–59)
HGB BLD-MCNC: 15.2 G/DL
IMM GRANULOCYTES # BLD AUTO: 0.01 X10(3) UL (ref 0–1)
IMM GRANULOCYTES NFR BLD: 0.2 %
KETONES (URINE DIPSTICK): NEGATIVE MG/DL
LDLC SERPL CALC-MCNC: 111 MG/DL (ref ?–100)
LYMPHOCYTES # BLD AUTO: 1.93 X10(3) UL (ref 1–4)
LYMPHOCYTES NFR BLD AUTO: 39.8 %
MCH RBC QN AUTO: 30.5 PG (ref 26–34)
MCHC RBC AUTO-ENTMCNC: 34.7 G/DL (ref 31–37)
MCV RBC AUTO: 88 FL
MONOCYTES # BLD AUTO: 0.37 X10(3) UL (ref 0.1–1)
MONOCYTES NFR BLD AUTO: 7.6 %
MULTISTIX LOT#: ABNORMAL NUMERIC
NEUTROPHILS # BLD AUTO: 2.26 X10 (3) UL (ref 1.5–7.7)
NEUTROPHILS # BLD AUTO: 2.26 X10(3) UL (ref 1.5–7.7)
NEUTROPHILS NFR BLD AUTO: 46.7 %
NITRITE, URINE: NEGATIVE
NONHDLC SERPL-MCNC: 122 MG/DL (ref ?–130)
OSMOLALITY SERPL CALC.SUM OF ELEC: 290 MOSM/KG (ref 275–295)
PH, URINE: 6 (ref 4.5–8)
PLATELET # BLD AUTO: 302 10(3)UL (ref 150–450)
POTASSIUM SERPL-SCNC: 4.4 MMOL/L (ref 3.5–5.1)
PROT SERPL-MCNC: 7.2 G/DL (ref 6.4–8.2)
PROTEIN (URINE DIPSTICK): NEGATIVE MG/DL
RBC # BLD AUTO: 4.98 X10(6)UL
SODIUM SERPL-SCNC: 139 MMOL/L (ref 136–145)
SPECIFIC GRAVITY: 1.03 (ref 1–1.03)
TRIGL SERPL-MCNC: 54 MG/DL (ref 30–149)
TSI SER-ACNC: 0.71 MIU/ML (ref 0.36–3.74)
URINE-COLOR: YELLOW
UROBILINOGEN,SEMI-QN: 0.2 MG/DL (ref 0–1.9)
VIT D+METAB SERPL-MCNC: 17.9 NG/ML (ref 30–100)
VLDLC SERPL CALC-MCNC: 9 MG/DL (ref 0–30)
WBC # BLD AUTO: 4.9 X10(3) UL (ref 4–11)

## 2024-04-29 PROCEDURE — 87077 CULTURE AEROBIC IDENTIFY: CPT | Performed by: FAMILY MEDICINE

## 2024-04-29 PROCEDURE — 82306 VITAMIN D 25 HYDROXY: CPT | Performed by: FAMILY MEDICINE

## 2024-04-29 PROCEDURE — 87186 SC STD MICRODIL/AGAR DIL: CPT | Performed by: FAMILY MEDICINE

## 2024-04-29 PROCEDURE — 87086 URINE CULTURE/COLONY COUNT: CPT | Performed by: FAMILY MEDICINE

## 2024-04-29 PROCEDURE — 80061 LIPID PANEL: CPT | Performed by: FAMILY MEDICINE

## 2024-04-29 PROCEDURE — 80050 GENERAL HEALTH PANEL: CPT | Performed by: FAMILY MEDICINE

## 2024-04-29 RX ORDER — NITROFURANTOIN 25; 75 MG/1; MG/1
100 CAPSULE ORAL 2 TIMES DAILY
Qty: 10 CAPSULE | Refills: 0 | Status: SHIPPED | OUTPATIENT
Start: 2024-04-29 | End: 2024-05-04

## 2024-04-29 NOTE — PROGRESS NOTES
Luma Trevino is a 49 year old female.    CC:    Chief Complaint   Patient presents with    Well Adult       HPI:  Wellness     Exercise: Yes  Drinks water: Yes  Eat variety of fruits & vegetables, lean meats: Yes  Issues with sleep: No  Regular dental exams: No  Change in size/consistency of stool: No  Change in appearance of mole: No    Gyne Hx  OB History   No obstetric history on file.     No LMP recorded. (Menstrual status: IUD - Intrauterine Device).  Previous pap: NORMAL       CAGE Alcohol Screening       4/29/2024     8:41 AM   CAGE Flowsheet   Have you ever felt you should Cut down on your drinking? 0   Have people Annoyed you by criticizing your drinking? 0   Have you ever felt bad or Guilty about your drinking? 0   Have you ever had a drink first thing in the morning to steady your nerves or to get rid of a hangover (Eye opener)? 0   Total Score: Item responses on the CAGE are scored 0 or 1, with a higher score an indication of alcohol 0   Total Score: Item responses on the CAGE are scored 0 or 1, with a higher score an indication of alcohol No Risk        Depression Screening (PHQ-2/PHQ-9): Over the LAST 2 WEEKS   Little interest or pleasure in doing things: Not at all    Feeling down, depressed, or hopeless: Not at all    PHQ-2 SCORE: 0          [place] Suicide Severity Rating Scale Screener   In what setting is the screener performed?: an office visit  1. Have you wished you were dead or wished you could go to sleep and not wake up? (past 30 days): No  2. Have you actually had any thoughts of killing yourself? (past 30 days): No  6. Have you ever done anything, started to do anything, or prepared to do anything to end your life? (lifetime): No  Score and Suggested Actions - Office Visit: No Risk  Score and Intervention  Score and Suggested Actions - Office Visit: No Risk    Also wants to discuss:     Toe infection   Finished abx - 7 days (levofloxacin and augmentin prescribed on 3/28/24)   Has MRI  scheduled to r/o osteomyelitits   Has appt to see ID     Urinary sx   Discomfort, inflammation bilateral pelvic areas   Strong odor and cloudy   No vaginal discharge     Weight   States she feels she is retaining water, feels heaviness kaity in gut          Allergies:  No Known Allergies   Current Meds:  No current outpatient medications on file prior to visit.     No current facility-administered medications on file prior to visit.        History:  History reviewed. No pertinent past medical history.   Past Surgical History:   Procedure Laterality Date            Family History   Problem Relation Age of Onset    Diabetes Maternal Grandmother     Diabetes Maternal Grandfather     Diabetes Paternal Grandmother     Diabetes Paternal Grandfather       Family Status   Relation Status    MGMA     MGFA     PGMA     PGFA       Social History     Socioeconomic History    Marital status:    Tobacco Use    Smoking status: Every Day     Types: Cigarettes    Smokeless tobacco: Never   Vaping Use    Vaping status: Never Used   Substance and Sexual Activity    Alcohol use: Not Currently    Drug use: No            There is no immunization history on file for this patient.    Wt Readings from Last 6 Encounters:   24 175 lb (79.4 kg)   23 170 lb (77.1 kg)   23 170 lb (77.1 kg)   10/18/18 174 lb (78.9 kg)   10/06/18 171 lb (77.6 kg)   18 172 lb 3.2 oz (78.1 kg)       BP Readings from Last 3 Encounters:   24 90/60   23 90/60   23 101/78       REVIEW OF SYSTEMS:   Review of Systems   Constitutional:  Negative for chills, fever and malaise/fatigue.   HENT:  Negative for congestion, ear pain and sore throat.    Eyes:  Negative for blurred vision, double vision and pain.   Respiratory:  Negative for cough, shortness of breath and wheezing.    Cardiovascular:  Negative for chest pain, palpitations and leg swelling.   Gastrointestinal:  Negative for  abdominal pain, blood in stool, constipation, diarrhea, melena, nausea and vomiting.   Genitourinary:  Negative for dysuria, flank pain and frequency.   Musculoskeletal:  Negative for back pain, joint pain and myalgias.   Skin:  Negative for itching and rash.   Neurological:  Negative for dizziness, weakness and headaches.   Endo/Heme/Allergies:  Negative for environmental allergies and polydipsia. Does not bruise/bleed easily.   Psychiatric/Behavioral:  Negative for depression. The patient is not nervous/anxious and does not have insomnia.        EXAM:   BP 90/60   Pulse 62   Temp 98 °F (36.7 °C)   Resp 16   Ht 5' 4\" (1.626 m)   Wt 175 lb (79.4 kg)   SpO2 98%   BMI 30.04 kg/m²   Body mass index is 30.04 kg/m².  No LMP recorded. (Menstrual status: IUD - Intrauterine Device).    Physical Exam  Constitutional:       General: She is not in acute distress.     Appearance: Normal appearance.   HENT:      Right Ear: Tympanic membrane normal.      Left Ear: Tympanic membrane normal.      Mouth/Throat:      Mouth: Mucous membranes are moist.      Pharynx: Oropharynx is clear. No posterior oropharyngeal erythema.   Eyes:      Extraocular Movements: Extraocular movements intact.      Conjunctiva/sclera: Conjunctivae normal.      Pupils: Pupils are equal, round, and reactive to light.   Cardiovascular:      Rate and Rhythm: Normal rate and regular rhythm.      Pulses: Normal pulses.      Heart sounds: Normal heart sounds.   Pulmonary:      Effort: Pulmonary effort is normal.      Breath sounds: Normal breath sounds. No wheezing or rhonchi.   Abdominal:      General: Abdomen is flat. Bowel sounds are normal.      Palpations: Abdomen is soft.      Tenderness: There is no abdominal tenderness. There is no guarding.   Musculoskeletal:         General: No swelling, tenderness, deformity or signs of injury.      Cervical back: Neck supple.   Lymphadenopathy:      Cervical: No cervical adenopathy.   Skin:     General: Skin is  warm and dry.      Findings: No rash.   Neurological:      General: No focal deficit present.      Mental Status: She is alert and oriented to person, place, and time.      Motor: No weakness.   Psychiatric:         Mood and Affect: Mood normal.         Behavior: Behavior normal.         Thought Content: Thought content normal.                ASSESSMENT/PLAN:      1. Routine physical examination  Labs due   Rec immunizations: pcv, tdap - patient declines   Follow up with gyn for routine pelvic exam     2. Colon cancer screening  Cologuard ordered     3. Lower urinary tract symptoms  - Urine Dip, auto without Micro  - Urine Culture, Routine  Macrobid sent - follow up culture     4. Encounter for screening mammogram for breast cancer  - [facility] YAIR 2D+3D SCREENING BILAT (CPT=77067/56607); Future         Health Maintenance   Topic Date Due    Annual Physical  Never done    Colorectal Cancer Screening  Never done    Mammogram  Never done    Pneumococcal Vaccine: Birth to 64yrs (1 of 2 - PCV) Never done    Tobacco Cessation Counseling 1 Year  Never done    DTaP,Tdap,and Td Vaccines (1 - Tdap) Never done    COVID-19 Vaccine (1 - 2023-24 season) Never done    Annual Depression Screening  Never done    Influenza Vaccine (Season Ended) 10/01/2024    Zoster Vaccines (1 of 2) 01/17/2025    Pap Smear  07/20/2025         Healthy diet and regular exercise discussed     Meds & Refills for this Visit:  Requested Prescriptions     Signed Prescriptions Disp Refills    nitrofurantoin monohydrate macro 100 MG Oral Cap 10 capsule 0     Sig: Take 1 capsule (100 mg total) by mouth 2 (two) times daily for 5 days.         Imaging & Consults:  [facility] YAIR 2D+3D SCREENING BILAT (CPT=77067/19133)    Return in about 1 year (around 4/29/2025) for annual physical.

## 2024-04-30 ENCOUNTER — HOSPITAL ENCOUNTER (OUTPATIENT)
Dept: MRI IMAGING | Age: 49
Discharge: HOME | End: 2024-04-30
Attending: PODIATRIST
Payer: COMMERCIAL

## 2024-04-30 ENCOUNTER — MED REC SCAN ONLY (OUTPATIENT)
Dept: FAMILY MEDICINE CLINIC | Facility: CLINIC | Age: 49
End: 2024-04-30

## 2024-04-30 DIAGNOSIS — S91.204D: ICD-10-CM

## 2024-04-30 PROCEDURE — 73718 MRI LOWER EXTREMITY W/O DYE: CPT | Performed by: PODIATRIST

## 2024-05-08 ENCOUNTER — PATIENT OUTREACH (OUTPATIENT)
Dept: INFECTIOUS DISEASE | Facility: CLINIC | Age: 49
End: 2024-05-08

## 2024-05-08 NOTE — PROGRESS NOTES
Developed callus at the web space of 4th/5th toe, and saw podiatrist, and was debrided.  Site was cultured and given antibiotics x 7 days, now improving.  MRI shows no OM.  Exam shows healing wound without signs of infection.  Advised patient to follow up with podiatrist.

## 2024-07-18 ENCOUNTER — OFFICE VISIT (OUTPATIENT)
Dept: FAMILY MEDICINE CLINIC | Facility: CLINIC | Age: 49
End: 2024-07-18
Payer: COMMERCIAL

## 2024-07-18 VITALS
HEART RATE: 71 BPM | SYSTOLIC BLOOD PRESSURE: 108 MMHG | TEMPERATURE: 97 F | WEIGHT: 175 LBS | RESPIRATION RATE: 16 BRPM | OXYGEN SATURATION: 99 % | HEIGHT: 64 IN | BODY MASS INDEX: 29.88 KG/M2 | DIASTOLIC BLOOD PRESSURE: 72 MMHG

## 2024-07-18 DIAGNOSIS — K59.00 CONSTIPATION, UNSPECIFIED CONSTIPATION TYPE: Primary | ICD-10-CM

## 2024-07-18 DIAGNOSIS — R82.90 ABNORMAL URINE ODOR: ICD-10-CM

## 2024-07-18 DIAGNOSIS — R39.9 URINARY SYMPTOM OR SIGN: ICD-10-CM

## 2024-07-18 LAB
APPEARANCE: CLEAR
BILIRUBIN: NEGATIVE
GLUCOSE (URINE DIPSTICK): NEGATIVE MG/DL
KETONES (URINE DIPSTICK): NEGATIVE MG/DL
LEUKOCYTES: NEGATIVE
MULTISTIX LOT#: NORMAL NUMERIC
NITRITE, URINE: NEGATIVE
OCCULT BLOOD: NEGATIVE
PH, URINE: 7.5 (ref 4.5–8)
PROTEIN (URINE DIPSTICK): NEGATIVE MG/DL
SPECIFIC GRAVITY: 1.01 (ref 1–1.03)
URINE-COLOR: YELLOW
UROBILINOGEN,SEMI-QN: 0.2 MG/DL (ref 0–1.9)

## 2024-07-18 PROCEDURE — 87086 URINE CULTURE/COLONY COUNT: CPT | Performed by: NURSE PRACTITIONER

## 2024-07-18 PROCEDURE — 3074F SYST BP LT 130 MM HG: CPT | Performed by: NURSE PRACTITIONER

## 2024-07-18 PROCEDURE — 3008F BODY MASS INDEX DOCD: CPT | Performed by: NURSE PRACTITIONER

## 2024-07-18 PROCEDURE — 99213 OFFICE O/P EST LOW 20 MIN: CPT | Performed by: NURSE PRACTITIONER

## 2024-07-18 PROCEDURE — 3078F DIAST BP <80 MM HG: CPT | Performed by: NURSE PRACTITIONER

## 2024-07-18 PROCEDURE — 81003 URINALYSIS AUTO W/O SCOPE: CPT | Performed by: NURSE PRACTITIONER

## 2024-07-19 NOTE — PROGRESS NOTES
Chief Complaint   Patient presents with    Urinary Symptoms     Cloudy urine, odor   Denies burning w urination, low back pain      HPI:   Luma Trevino is a 49 year old female who presents for complaints of constipation and cloudy urine with an odor.  Denies dysuria, frequency, urgency.  Cause - unknown, but quit smoking and coffee about 6 weeks ago  Onset - past few weeks  C/o abdominal pain: yes, bloating.  Location of pain - Periumbilical. Pain described as -  bloating, fullness .  Pain severity mild. Radiation - no  Pain is aggravated by nothing specific.   Denies symptoms of diarrhea, melena, hematochezia, hematemesis, heartburn, retching, belching, dyspepsia, odynophagia, back pain, dysuria, urinary frequency, urgency, hesitancy, hematuria, low back pain, fever, chills, nausea or vomiting.  Not currently sexually active, denies vaginal discharge.      H/o constipation in past: no.    OTC meds or prescription meds tried: Miralax, Metamucil, MOM, but not consistently  Recent change in diet: yes  Drinking fluids appropiately: yes  Any recent work up for constipation in the past: no  Appetite: no change     History of:  - GERD: no  - Peptic ulcer disease: no.   - Constipation: no  - Smoker: yes, quit 24    No current outpatient medications on file.      No past medical history on file.   Past Surgical History:   Procedure Laterality Date            Social History     Socioeconomic History    Marital status:    Tobacco Use    Smoking status: Former     Current packs/day: 0.03     Types: Cigarettes    Smokeless tobacco: Never   Vaping Use    Vaping status: Never Used   Substance and Sexual Activity    Alcohol use: Not Currently    Drug use: No        FAMILY HX:   Family History   Problem Relation Age of Onset    Diabetes Maternal Grandmother     Diabetes Maternal Grandfather     Diabetes Paternal Grandmother     Diabetes Paternal Grandfather         REVIEW OF SYSTEMS:   GENERAL: feels well  otherwise  SKIN: denies any unusual skin lesions  EYES: denies blurred vision or double vision  HEENT: denies nasal congestion, sinus pain or ST  LUNGS: denies shortness of breath with exertion  CARDIOVASCULAR: denies chest pain on exertion  GI: as above  : denies nocturia or changes in stream  MUSCULOSKELETAL: denies back pain  NEURO: denies headaches  HEMATOLOGIC: denies hx of anemia  ENDOCRINE: denies thyroid history  ALL/ASTHMA: denies hx of allergy or asthma    EXAM:   /72   Pulse 71   Temp 97.4 °F (36.3 °C)   Resp 16   Ht 5' 4\" (1.626 m)   Wt 175 lb (79.4 kg)   SpO2 99%   BMI 30.04 kg/m²   GENERAL: well developed, well nourished, in no apparent distress  SKIN: no rashes, no suspicious lesions  HEENT: atraumatic, normocephalic, ears and throat are clear  EYES: PERRLA, EOMI, normal optic disk,conjunctiva are clear  NECK: supple, no adenopathy, no bruits  CHEST: no chest tenderness  LUNGS: clear to auscultation  CARDIO: RRR without murmur  GI: soft, non distended. No visible peristalsis. No masses. No organomegaly. No CVAT  No tenderness in all quadrants.   MUSCULOSKELETAL: back is not tender,FROM of the back  EXTREMITIES: no cyanosis, clubbing or edema  NEURO: Oriented times three, cranial nerves are intact, motor and sensory are grossly intact    Recent Results (from the past 24 hour(s))   URINALYSIS, AUTO, W/O SCOPE    Collection Time: 07/18/24  6:52 PM   Result Value Ref Range    Glucose Urine Negative Negative mg/dL    Bilirubin Urine Negative Negative    Ketones, UA Negative Negative - Trace mg/dL    Spec Gravity 1.015 1.005 - 1.030    Blood Urine Negative Negative    PH Urine 7.5 5.0 - 8.0    Protein Urine Negative Negative - Trace mg/dL    Urobilinogen Urine 0.2 0.2 - 1.0 mg/dL    Nitrite Urine Negative Negative    Leukocyte Esterase Urine Negative Negative    APPEARANCE Clear Clear    Color Urine Yellow Yellow    Multistix Lot# [id number] Numeric    Multistix Expiration Date 4/30/25 Date          ASSESSMENT AND PLAN:   @APL@  Encounter Diagnoses   Name Primary?    Urinary symptom or sign     Abnormal urine odor     Constipation, unspecified constipation type Yes       Orders Placed This Encounter   Procedures    URINALYSIS, AUTO, W/O SCOPE    Urine Culture, Routine [E]       Meds & Refills for this Visit:  Requested Prescriptions      No prescriptions requested or ordered in this encounter       Imaging & Consults:  None    Follow Up with:  No follow-up provider specified.      Plan:  Declined pelvic exam with swabs. Urine culture sent.  Will treat accordingly.  Medicines/Laxatives: OTC Miralax,   Fiber supplements  Bowel movement softeners: Colace  Increase the amount of liquids your child drinks. Limit sports drinks, soda, and other caffeinated drinks.   Increase of high-fiber foods  Regular physical activity  Return to the emergency department if:    Blood in stool    Abdomen is swollen.    Loss of appetite and fluids    Severe abdominal or rectal pain    Vomiting   The patient/parent indicates understanding of these issues and agrees to the plan.  @[facility]@

## 2025-04-24 ENCOUNTER — OFFICE VISIT (OUTPATIENT)
Dept: FAMILY MEDICINE CLINIC | Facility: CLINIC | Age: 50
End: 2025-04-24
Payer: COMMERCIAL

## 2025-04-24 VITALS
DIASTOLIC BLOOD PRESSURE: 80 MMHG | RESPIRATION RATE: 18 BRPM | OXYGEN SATURATION: 99 % | HEART RATE: 66 BPM | WEIGHT: 175 LBS | TEMPERATURE: 100 F | SYSTOLIC BLOOD PRESSURE: 109 MMHG | BODY MASS INDEX: 29.88 KG/M2 | HEIGHT: 64 IN

## 2025-04-24 DIAGNOSIS — K04.7 DENTAL ABSCESS: Primary | ICD-10-CM

## 2025-04-24 PROCEDURE — 3074F SYST BP LT 130 MM HG: CPT | Performed by: NURSE PRACTITIONER

## 2025-04-24 PROCEDURE — 3079F DIAST BP 80-89 MM HG: CPT | Performed by: NURSE PRACTITIONER

## 2025-04-24 PROCEDURE — 3008F BODY MASS INDEX DOCD: CPT | Performed by: NURSE PRACTITIONER

## 2025-04-24 PROCEDURE — 99213 OFFICE O/P EST LOW 20 MIN: CPT | Performed by: NURSE PRACTITIONER

## 2025-04-24 RX ORDER — AMOXICILLIN 875 MG/1
875 TABLET, COATED ORAL 2 TIMES DAILY
Qty: 20 TABLET | Refills: 0 | Status: SHIPPED | OUTPATIENT
Start: 2025-04-24 | End: 2025-05-04

## 2025-04-24 NOTE — PROGRESS NOTES
Chief Complaint   Patient presents with    Other     Swollen gum on right side and post nasal drip . Since Tuesday   OTC: Ibuprofen        HPI:     Luma Trevino is a 50 year old old female presents for evaluation and management of a chief complaint of dental pain. Onset yesterday.   Patient describes pain as aching and throbbing. Pain severity now is moderate. The pain does not radiate. Patient denies jaw swelling. Fever: no   Pain is aggravated by movement, use, and palpation. Pain is alleviated by NSAIDS, mildly.  The patient denies other complaints. Patient has not sought treatment by a dentist for this problem. Care prior to arrival consisted of NSAID, with minimal relief.     Patient reports:  Jaw pain: yes    Pain with chewing:  Yes   Difficulty swallowing:  No   Facial swelling:  No   Sensitivity:  Yes   Regular DDS exams:  No   H/o dental asbcess: no     Current Medications[1]  Past Medical History[2]   Past Surgical History[3]    ROS:           Physical Exam:       Vitals:    04/24/25 1634   BP: 109/80   Pulse: 66   Resp: 18   Temp: 99.5 °F (37.5 °C)       GENERAL: well developed, well nourished, well hydrated, no distress  SKIN: good skin turgor, no obvious rashes  HEENT: atraumatic, normocephalic, ears, nose and throat are clear,  Dental Exam:    - Oral hygiene: good   - Facial/jaw  swelling: No   - Teeth condition:good  - Gums: swollen upper right  - Signs of abscess: yes   - Tongue: normal   NECK: supple, no adenopathy, no thyromegaly  LUNGS: clear to auscultation, no RRW  CARDIO: RRR without murmur  EXTREMITIES: no cyanosis, clubbing or edema  GI: soft, non-tender, normal bowel sounds    MDM/Assessment/Plan:   Orders for this encounter:    @ap@    Plan:  - Antibiotic: Amoxicillin 875 mg BID X 10 days  - Pain med: OTC  - follow up with dentist  - Proceed to ED of worsening   All results reviewed and discussed with patient.  See AVS for detailed discharge instructions for your condition today.         [1]    Current Outpatient Medications   Medication Sig Dispense Refill    amoxicillin 875 MG Oral Tab Take 1 tablet (875 mg total) by mouth 2 (two) times daily for 10 days. 20 tablet 0   [2] No past medical history on file.  [3]   Past Surgical History:  Procedure Laterality Date

## 2025-05-24 ENCOUNTER — APPOINTMENT (OUTPATIENT)
Dept: ULTRASOUND IMAGING | Age: 50
End: 2025-05-24
Attending: EMERGENCY MEDICINE
Payer: COMMERCIAL

## 2025-05-24 ENCOUNTER — OFFICE VISIT (OUTPATIENT)
Dept: FAMILY MEDICINE CLINIC | Facility: CLINIC | Age: 50
End: 2025-05-24
Payer: COMMERCIAL

## 2025-05-24 ENCOUNTER — APPOINTMENT (OUTPATIENT)
Dept: CT IMAGING | Age: 50
End: 2025-05-24
Attending: EMERGENCY MEDICINE
Payer: COMMERCIAL

## 2025-05-24 ENCOUNTER — HOSPITAL ENCOUNTER (EMERGENCY)
Age: 50
Discharge: HOME | End: 2025-05-24
Attending: EMERGENCY MEDICINE
Payer: COMMERCIAL

## 2025-05-24 VITALS
BODY MASS INDEX: 29.88 KG/M2 | OXYGEN SATURATION: 100 % | RESPIRATION RATE: 16 BRPM | WEIGHT: 175 LBS | HEIGHT: 64 IN | HEART RATE: 95 BPM | DIASTOLIC BLOOD PRESSURE: 74 MMHG | SYSTOLIC BLOOD PRESSURE: 120 MMHG | TEMPERATURE: 98 F

## 2025-05-24 VITALS
HEART RATE: 65 BPM | HEIGHT: 64 IN | BODY MASS INDEX: 30.39 KG/M2 | WEIGHT: 178 LBS | OXYGEN SATURATION: 99 % | RESPIRATION RATE: 16 BRPM | SYSTOLIC BLOOD PRESSURE: 118 MMHG | DIASTOLIC BLOOD PRESSURE: 76 MMHG | TEMPERATURE: 99 F

## 2025-05-24 DIAGNOSIS — R10.9 ABDOMINAL PAIN OF UNKNOWN ETIOLOGY: Primary | ICD-10-CM

## 2025-05-24 DIAGNOSIS — R10.9 RIGHT SIDED ABDOMINAL PAIN: Primary | ICD-10-CM

## 2025-05-24 DIAGNOSIS — D72.819 LEUKOPENIA, UNSPECIFIED TYPE: ICD-10-CM

## 2025-05-24 DIAGNOSIS — R14.0 BLOATING: ICD-10-CM

## 2025-05-24 LAB
ALBUMIN SERPL-MCNC: 5.3 G/DL (ref 3.2–4.8)
ALBUMIN/GLOB SERPL: 2 {RATIO} (ref 1–2)
ALP LIVER SERPL-CCNC: 68 U/L (ref 39–100)
ALT SERPL-CCNC: 36 U/L (ref 10–49)
ANION GAP SERPL CALC-SCNC: 4 MMOL/L (ref 0–18)
AST SERPL-CCNC: 24 U/L (ref ?–34)
B-HCG UR QL: NEGATIVE
BASOPHILS # BLD AUTO: 0.02 X10(3) UL (ref 0–0.2)
BASOPHILS NFR BLD AUTO: 0.7 %
BILIRUB SERPL-MCNC: 0.7 MG/DL (ref 0.3–1.2)
BILIRUB UR QL STRIP.AUTO: NEGATIVE
BUN BLD-MCNC: 12 MG/DL (ref 9–23)
CALCIUM BLD-MCNC: 9.6 MG/DL (ref 8.7–10.6)
CHLORIDE SERPL-SCNC: 107 MMOL/L (ref 98–112)
CLARITY UR REFRACT.AUTO: CLEAR
CO2 SERPL-SCNC: 29 MMOL/L (ref 21–32)
COLOR UR AUTO: YELLOW
CREAT BLD-MCNC: 0.81 MG/DL (ref 0.55–1.02)
EGFRCR SERPLBLD CKD-EPI 2021: 88 ML/MIN/1.73M2 (ref 60–?)
EOSINOPHIL # BLD AUTO: 0.11 X10(3) UL (ref 0–0.7)
EOSINOPHIL NFR BLD AUTO: 4 %
ERYTHROCYTE [DISTWIDTH] IN BLOOD BY AUTOMATED COUNT: 12 %
GLOBULIN PLAS-MCNC: 2.6 G/DL (ref 2–3.5)
GLUCOSE BLD-MCNC: 100 MG/DL (ref 70–99)
GLUCOSE UR STRIP.AUTO-MCNC: NEGATIVE MG/DL
HCT VFR BLD AUTO: 44.1 % (ref 35–48)
HGB BLD-MCNC: 15.4 G/DL (ref 12–16)
IMM GRANULOCYTES # BLD AUTO: 0 X10(3) UL (ref 0–1)
IMM GRANULOCYTES NFR BLD: 0 %
KETONES UR STRIP.AUTO-MCNC: NEGATIVE MG/DL
LEUKOCYTE ESTERASE UR QL STRIP.AUTO: NEGATIVE
LIPASE SERPL-CCNC: 67 U/L (ref 12–53)
LYMPHOCYTES # BLD AUTO: 1.2 X10(3) UL (ref 1–4)
LYMPHOCYTES NFR BLD AUTO: 43.6 %
MCH RBC QN AUTO: 29.9 PG (ref 26–34)
MCHC RBC AUTO-ENTMCNC: 34.9 G/DL (ref 31–37)
MCV RBC AUTO: 85.6 FL (ref 80–100)
MONOCYTES # BLD AUTO: 0.28 X10(3) UL (ref 0.1–1)
MONOCYTES NFR BLD AUTO: 10.2 %
NEUTROPHILS # BLD AUTO: 1.14 X10 (3) UL (ref 1.5–7.7)
NEUTROPHILS # BLD AUTO: 1.14 X10(3) UL (ref 1.5–7.7)
NEUTROPHILS NFR BLD AUTO: 41.5 %
NITRITE UR QL STRIP.AUTO: NEGATIVE
OSMOLALITY SERPL CALC.SUM OF ELEC: 290 MOSM/KG (ref 275–295)
PH UR STRIP.AUTO: 7 [PH] (ref 5–8)
PLATELET # BLD AUTO: 226 10(3)UL (ref 150–450)
POTASSIUM SERPL-SCNC: 4.2 MMOL/L (ref 3.5–5.1)
PROT SERPL-MCNC: 7.9 G/DL (ref 5.7–8.2)
PROT UR STRIP.AUTO-MCNC: NEGATIVE MG/DL
RBC # BLD AUTO: 5.15 X10(6)UL (ref 3.8–5.3)
RBC UR QL AUTO: NEGATIVE
SODIUM SERPL-SCNC: 140 MMOL/L (ref 136–145)
SP GR UR STRIP.AUTO: 1.01 (ref 1–1.03)
UROBILINOGEN UR STRIP.AUTO-MCNC: 0.2 MG/DL
WBC # BLD AUTO: 2.8 X10(3) UL (ref 4–11)

## 2025-05-24 PROCEDURE — 81025 URINE PREGNANCY TEST: CPT

## 2025-05-24 PROCEDURE — 99284 EMERGENCY DEPT VISIT MOD MDM: CPT

## 2025-05-24 PROCEDURE — 96374 THER/PROPH/DIAG INJ IV PUSH: CPT

## 2025-05-24 PROCEDURE — 74177 CT ABD & PELVIS W/CONTRAST: CPT | Performed by: EMERGENCY MEDICINE

## 2025-05-24 PROCEDURE — 80053 COMPREHEN METABOLIC PANEL: CPT | Performed by: EMERGENCY MEDICINE

## 2025-05-24 PROCEDURE — 96361 HYDRATE IV INFUSION ADD-ON: CPT

## 2025-05-24 PROCEDURE — 81003 URINALYSIS AUTO W/O SCOPE: CPT | Performed by: EMERGENCY MEDICINE

## 2025-05-24 PROCEDURE — 85025 COMPLETE CBC W/AUTO DIFF WBC: CPT | Performed by: EMERGENCY MEDICINE

## 2025-05-24 PROCEDURE — 96375 TX/PRO/DX INJ NEW DRUG ADDON: CPT

## 2025-05-24 PROCEDURE — 83690 ASSAY OF LIPASE: CPT | Performed by: EMERGENCY MEDICINE

## 2025-05-24 PROCEDURE — 76700 US EXAM ABDOM COMPLETE: CPT | Performed by: EMERGENCY MEDICINE

## 2025-05-24 RX ORDER — DICYCLOMINE HCL 20 MG
20 TABLET ORAL 4 TIMES DAILY PRN
Qty: 30 TABLET | Refills: 0 | Status: SHIPPED | OUTPATIENT
Start: 2025-05-24 | End: 2025-06-23

## 2025-05-24 RX ORDER — PANTOPRAZOLE SODIUM 40 MG/1
40 TABLET, DELAYED RELEASE ORAL DAILY
Qty: 30 TABLET | Refills: 0 | Status: SHIPPED | OUTPATIENT
Start: 2025-05-24 | End: 2025-06-23

## 2025-05-24 RX ORDER — SODIUM CHLORIDE 9 MG/ML
INJECTION, SOLUTION INTRAVENOUS CONTINUOUS
Status: DISCONTINUED | OUTPATIENT
Start: 2025-05-24 | End: 2025-05-24

## 2025-05-24 RX ORDER — KETOROLAC TROMETHAMINE 15 MG/ML
15 INJECTION, SOLUTION INTRAMUSCULAR; INTRAVENOUS ONCE
Status: COMPLETED | OUTPATIENT
Start: 2025-05-24 | End: 2025-05-24

## 2025-05-24 RX ORDER — ONDANSETRON 2 MG/ML
4 INJECTION INTRAMUSCULAR; INTRAVENOUS ONCE
Status: COMPLETED | OUTPATIENT
Start: 2025-05-24 | End: 2025-05-24

## 2025-05-24 RX ORDER — FAMOTIDINE 10 MG/ML
20 INJECTION, SOLUTION INTRAVENOUS ONCE
Status: COMPLETED | OUTPATIENT
Start: 2025-05-24 | End: 2025-05-24

## 2025-05-24 NOTE — DISCHARGE INSTRUCTIONS
Follow-up with your primary care doctor for repeat imaging of pulmonary nodule as needed and repeat blood count regarding low white blood cell count.

## 2025-05-24 NOTE — ED PROVIDER NOTES
Patient Seen in: ward Emergency Department In [place]        History  Chief Complaint   Patient presents with    Abdomen/Flank Pain     Stated Complaint: abd pain    Subjective:   HPI            Patient presents with right upper quadrant pain.  The patient states that  she got up from the couch and felt like something moved inside her abdomen.  It did not hurt a lot at that time.  Monday and Tuesday she notes that in the morning she was seeing spots in her vision but it progressively improved and now seems to have resolved.  Throughout the week she has felt bloated and constipated.  She started taking \"colon health\" followed by Gas-X and a laxative tea.  She states that yesterday she did have a normal bowel movement but it did not really give her relief.  She feels bloated even with drinking water.  She has not vomited.  She denies urinary symptoms.  She does tend towards constipation and had to go back to smoking because when she quit she could not have a bowel movement.      Objective:     History reviewed. No pertinent past medical history.           Past Surgical History:   Procedure Laterality Date                      Social History     Socioeconomic History    Marital status:    Tobacco Use    Smoking status: Some Days     Current packs/day: 0.03     Types: Cigarettes    Smokeless tobacco: Never   Vaping Use    Vaping status: Never Used   Substance and Sexual Activity    Alcohol use: Not Currently    Drug use: No                                Physical Exam    ED Triage Vitals   BP 25 0955 116/84   Pulse 25 0955 74   Resp 25 0955 18   Temp 25 1002 98.6 °F (37 °C)   Temp src --    SpO2 25 0955 100 %   O2 Device 25 0955 None (Room air)       Current Vitals:   Vital Signs  BP: 118/76  Pulse: 65  Resp: 16  Temp: 98.6 °F (37 °C)    Oxygen Therapy  SpO2: 99 %  O2 Device: None (Room air)            Physical Exam  General: Alert and oriented x3 in no  acute distress.  Cardiovascular: Regular rate and rhythm, no murmurs.  Respiratory: Lungs clear to auscultation.  Abdomen: Soft, tender to palpation in the epigastric region and right upper quadrant primarily, mild tenderness in the right lower quadrant as well, no rebound or guarding, normal active bowel sounds, no CVA tenderness.  Extremities: No CCE.  Skin: Warm and dry.        ED Course  Labs Reviewed   COMP METABOLIC PANEL (14) - Abnormal; Notable for the following components:       Result Value    Glucose 100 (*)     Albumin 5.3 (*)     All other components within normal limits   CBC WITH DIFFERENTIAL WITH PLATELET - Abnormal; Notable for the following components:    WBC 2.8 (*)     Neutrophil Absolute Prelim 1.14 (*)     Neutrophil Absolute 1.14 (*)     All other components within normal limits   LIPASE - Abnormal; Notable for the following components:    Lipase 67 (*)     All other components within normal limits   POCT PREGNANCY URINE - Normal   URINALYSIS, ROUTINE        CT ABDOMEN+PELVIS(CONTRAST ONLY)(CPT=74177)  Result Date: 5/24/2025  PROCEDURE:  CT ABDOMEN+PELVIS (CONTRAST ONLY) (CPT=74177)  COMPARISON:  [facility], US, US ABDOMEN COMPLETE (CPT=76700), 5/24/2025, 10:39 AM.  INDICATIONS:  Abdominal pain  TECHNIQUE:  CT scanning was performed from the dome of the diaphragm to the pubic symphysis with non-ionic intravenous contrast material. Post contrast coronal MPR imaging was performed.  Dose reduction techniques were used. Dose information is transmitted to the ACR (American College of Radiology) NRDR (National Radiology Data Registry) which includes the Dose Index Registry.  PATIENT STATED HISTORY:(As transcribed by Technologist)  abd. pain    CONTRAST USED:  100cc of Isovue 370  FINDINGS: LUNG BASE:  4 mm noncalcified pulmonary nodule in the right lower lobe on image 16 series 301. Scattered atelectasis/scarring. LIVER:  Unremarkable. BILIARY:  Unremarkable. SPLEEN:  Unremarkable.  Accessory spleen  noted. PANCREAS:  Unremarkable. ADRENALS:  Unremarkable. KIDNEYS:  Subcentimeter hypodensities in the kidneys measuring up to 8 mm are too small to further characterize and warrant no specific follow-up. AORTA/VASCULAR:  Minimal mixed plaque in the aorta RETROPERITONEUM:  Unremarkable. BOWEL/MESENTERY:  Unremarkable appendix.  Scattered feces in the colon.  No large or small bowel dilatation.  No free air.  Minimal free fluid in the pelvis. ABDOMINAL WALL:  Unremarkable. PELVIC ORGANS:  Unremarkable urinary bladder.  IUD in the uterus.  Unremarkable left ovary.  2.1 cm probable functional cyst in the right ovary. LYMPH NODES:  No lymphadenopathy in the abdomen or pelvis. BONES:  Degenerative changes the spine and hips.  Orthopedic hardware in the proximal right femur. OTHER:  None.            CONCLUSION:   1. No acute intra-abdominal/pelvic process identified.  2. Minimal nonspecific free fluid in the pelvis may be physiologic.  3. 2.1 cm probable functional cyst in the right ovary.  4. 4 mm noncalcified pulmonary nodule in the partially imaged right lower lobe. Followup as per Fleischner criteria is suggested.  Please see above for further details.  The findings include a single, incidentally detected, solid pulmonary nodule, measuring less than 6mm.  2017 guidelines from the Fleischner Society for the follow-up and management of incidentally detected indeterminate pulmonary nodules in persons at least 35 years of age depend on nodule size (average length and width) and underlying risk factors (including smoking and other risk factors). Please consider the following recommendations after clinical assessment of risk factors.  For <6mm solid nodules: In low risk patients, no follow-up required.  If suspicious morphology or upper lobe location, consider 12 month follow-up.  In high risk patients, optional CT in 12 months.      LOCATION:  [facility]   Dictated by (CST): Caleb Sawyer MD on 5/24/2025 at 12:41 PM      Finalized by (CST): Caleb Sawyer MD on 5/24/2025 at 12:45 PM       US ABDOMEN COMPLETE (CPT=76700)  Result Date: 5/24/2025  PROCEDURE:  US ABDOMEN COMPLETE (CPT=76700)  COMPARISON:  None.  INDICATIONS:  abd pain  TECHNIQUE:  Real time gray-scale ultrasound was used to evaluate the abdomen.  The exam includes images of the liver, gallbladder, common bile duct, pancreas, spleen, kidneys, IVC, and aorta.  PATIENT STATED HISTORY: (As transcribed by Technologist)  Patient complains of diffuse abdominal pain since last night.    FINDINGS:  LIVER:  Normal size and echogenicity. No significant masses. BILIARY:  Normal appearing gallbladder, intrahepatic ducts, and common bile duct.  Common bile duct diameter is 3 mm.  Negative sonographic Yi's sign. PANCREAS:  Unremarkable. SPLEEN:  Unremarkable measuring up to 10.4 cm. KIDNEYS:  No hydronephrosis.  Right kidney measures 10.6 cm.  Left kidney measures 10.5 cm. AORTA/IVC:  Visualized portions are unremarkable.             CONCLUSION:  Unremarkable abdominal ultrasound.   LOCATION:  [facility]    Dictated by (CST): Caleb Sawyer MD on 5/24/2025 at 11:02 AM     Finalized by (CST): Caleb Sawyer MD on 5/24/2025 at 11:03 AM         Medications   sodium chloride 0.9% infusion (0 mL Intravenous Stopped 5/24/25 1318)   ondansetron (Zofran) 4 MG/2ML injection 4 mg (4 mg Intravenous Given 5/24/25 1028)   ketorolac (Toradol) 15 MG/ML injection 15 mg (15 mg Intravenous Given 5/24/25 1028)   famotidine (Pepcid) 20 mg/2mL injection 20 mg (20 mg Intravenous Given 5/24/25 1028)   iopamidol 76% (ISOVUE-370) injection for power injector (100 mL Intravenous Given 5/24/25 1158)     The patient was given the above medications for nausea and pain with some relief.                MDM     Patient presents with right upper quadrant pain.  Differential diagnosis includes but is not limited to acute cholecystitis, acute pancreatitis, constipation and acute appendicitis.  The patient had a  right upper quadrant ultrasound which showed a normal-appearing gallbladder and pancreas.  Her pancreas enzymes were elevated but her pancreas appeared unremarkable on CT as well.  She had a normal appendix with scattered stool in the colon and no evidence of obstruction.  Her UA is negative her labs reveal a low white blood cell count, which appears to be new.  Her liver enzymes and kidney function have come back normal.  The patient's symptoms may be due to gas and possibly IBS.  She has not previously had a colonoscopy.  She does also have some epigastric tenderness and could have underlying peptic ulcer disease.  She has somewhat chronic constipation and I think she would benefit from a GI evaluation.  In the meantime I will give her additional laxatives and Bentyl to use as needed for cramping.  I will also start her on a PPI and have counseled her regarding appropriate diet.  If she has new or worsening symptoms, she should return for repeat evaluation.  She will follow-up with her primary care doctor for her abnormal labs and pulmonary nodule.        MDM    Disposition and Plan     Clinical Impression:  1. Abdominal pain of unknown etiology    2. Bloating    3. Leukopenia, unspecified type         Disposition:  Discharge  5/24/2025  1:15 pm    Follow-up:  Brenden Anderson DO  [address]  [facility] [id number] [phone]    Follow up      Merlin Brown MD  [id number] [facility] [address] [phone]    Follow up            Medications Prescribed:  Discharge Medication List as of 5/24/2025  1:18 PM        START taking these medications    Details   pantoprazole 40 MG Oral Tab EC Take 1 tablet (40 mg total) by mouth daily., Normal, Disp-30 tablet, R-0      Magnesium Citrate Oral Solution Take 296 mL by mouth once for 1 dose., Normal, Disp-296 mL, R-0      dicyclomine 20 MG Oral Tab Take 1 tablet (20 mg total) by mouth 4 (four) times daily as needed., Normal, Disp-30 tablet, R-0                    Supplementary Documentation:

## 2025-05-24 NOTE — PROGRESS NOTES
Luma Trevino is a 50 year old female.    S:  Patient presents today with the following concerns:  Chief Complaint   Patient presents with    Abdominal Pain     X 1 week   Sx: right side abdominal pain, dehydration   Right sided abdominal and back pain for 5 days which is now worsening.  Was able to function all week but yesterday the pain became unignorable.  She has no appetite.  When eats feels nauseated.    No fevers or urinary symptoms.  Hx of c-sections.  Sister had cholecystectomy.    No excessive salivation or vomiting.    Does note that the skin in this area is also sensitive.  Maybe numb.  No rashes.     Current Medications[1]  Problem List[2]  Family History[3]    REVIEW OF SYSTEMS:  GENERAL: feels well otherwise  SKIN: denies any unusual skin lesions  EYES:denies vision change  LUNGS: denies shortness of breath with exertion  CARDIOVASCULAR: denies chest pain on exertion  GI: see above  : denies dysuria  MUSCULOSKELETAL: see above  NEURO: denies headaches    EXAM:  /74   Pulse 95   Temp 97.6 °F (36.4 °C)   Resp 16   Ht 5' 4\" (1.626 m)   Wt 175 lb (79.4 kg)   LMP  (LMP Unknown)   SpO2 100%   BMI 30.04 kg/m²   Physical Exam  Constitutional:       General: She is not in acute distress.     Appearance: Normal appearance. She is not ill-appearing, toxic-appearing or diaphoretic.   HENT:      Head: Normocephalic and atraumatic.   Eyes:      Extraocular Movements: Extraocular movements intact.      Conjunctiva/sclera: Conjunctivae normal.      Pupils: Pupils are equal, round, and reactive to light.   Pulmonary:      Effort: Pulmonary effort is normal.   Abdominal:      General: There is no distension.      Palpations: Abdomen is soft. There is no mass.      Tenderness: There is abdominal tenderness. There is no right CVA tenderness, left CVA tenderness, guarding or rebound.      Comments: Patient has right upper and lower quadrant tenderness on palpation.  Skin is also sensitive.     Neurological:       Mental Status: She is alert.        ASSESSMENT AND PLAN:  Luma Trevino is a 50 year old female.  Encounter Diagnosis   Name Primary?    Right sided abdominal pain Yes       No results found.     No orders of the defined types were placed in this encounter.    Meds & Refills for this Visit:  Requested Prescriptions      No prescriptions requested or ordered in this encounter     Imaging & Consults:  None    No follow-ups on file.     Discussed with patient need for ED evaluation.  This may be the start of shingles (no rash yet) but I cannot rule out cholecystitis or appendicitis in the walk in clinic.    Needs imaging.    Patient feels comfortable driving to the [facility] ED.  Declines EMS.      She left the clinic in stable condition.         [1]   No current outpatient medications on file.   [2]   Patient Active Problem List  Diagnosis    Sciatic pain, left   [3]   Family History  Problem Relation Age of Onset    Diabetes Maternal Grandmother     Diabetes Maternal Grandfather     Diabetes Paternal Grandmother     Diabetes Paternal Grandfather

## 2025-05-24 NOTE — ED INITIAL ASSESSMENT (HPI)
Pt to ed with RUQ pain since Sunday, Monday morning pt saw spots in right eye while she was driving, spots quickly resolved. Tuesday spots came back again and resolved. PT has not seen spots again. Abdominal pain is described as discomfort and bloating. Last normal BM last week, pt took colon health and increased water intake which hasn't helped with BM. Today decreased PO intake.

## 2025-05-29 ENCOUNTER — HOSPITAL ENCOUNTER (EMERGENCY)
Age: 50
Discharge: HOME | End: 2025-05-29
Attending: STUDENT IN AN ORGANIZED HEALTH CARE EDUCATION/TRAINING PROGRAM
Payer: COMMERCIAL

## 2025-05-29 ENCOUNTER — APPOINTMENT (OUTPATIENT)
Dept: MRI IMAGING | Age: 50
End: 2025-05-29
Attending: STUDENT IN AN ORGANIZED HEALTH CARE EDUCATION/TRAINING PROGRAM
Payer: COMMERCIAL

## 2025-05-29 VITALS
RESPIRATION RATE: 14 BRPM | OXYGEN SATURATION: 100 % | HEIGHT: 64 IN | HEART RATE: 76 BPM | SYSTOLIC BLOOD PRESSURE: 109 MMHG | WEIGHT: 171 LBS | TEMPERATURE: 98 F | BODY MASS INDEX: 29.19 KG/M2 | DIASTOLIC BLOOD PRESSURE: 75 MMHG

## 2025-05-29 DIAGNOSIS — H53.9 VISUAL AURA: ICD-10-CM

## 2025-05-29 DIAGNOSIS — R20.2 PARESTHESIAS: Primary | ICD-10-CM

## 2025-05-29 LAB
ALBUMIN SERPL-MCNC: 5 G/DL (ref 3.2–4.8)
ALBUMIN/GLOB SERPL: 1.9 {RATIO} (ref 1–2)
ALP LIVER SERPL-CCNC: 61 U/L (ref 39–100)
ALT SERPL-CCNC: 22 U/L (ref 10–49)
ANION GAP SERPL CALC-SCNC: 5 MMOL/L (ref 0–18)
AST SERPL-CCNC: 19 U/L (ref ?–34)
BASOPHILS # BLD AUTO: 0.03 X10(3) UL (ref 0–0.2)
BASOPHILS NFR BLD AUTO: 0.7 %
BILIRUB SERPL-MCNC: 0.5 MG/DL (ref 0.3–1.2)
BUN BLD-MCNC: 17 MG/DL (ref 9–23)
CALCIUM BLD-MCNC: 9.6 MG/DL (ref 8.7–10.6)
CHLORIDE SERPL-SCNC: 104 MMOL/L (ref 98–112)
CO2 SERPL-SCNC: 29 MMOL/L (ref 21–32)
CREAT BLD-MCNC: 0.88 MG/DL (ref 0.55–1.02)
EGFRCR SERPLBLD CKD-EPI 2021: 80 ML/MIN/1.73M2 (ref 60–?)
EOSINOPHIL # BLD AUTO: 0.11 X10(3) UL (ref 0–0.7)
EOSINOPHIL NFR BLD AUTO: 2.7 %
ERYTHROCYTE [DISTWIDTH] IN BLOOD BY AUTOMATED COUNT: 11.7 %
GLOBULIN PLAS-MCNC: 2.7 G/DL (ref 2–3.5)
GLUCOSE BLD-MCNC: 103 MG/DL (ref 70–99)
HCT VFR BLD AUTO: 41.6 % (ref 35–48)
HGB BLD-MCNC: 14.6 G/DL (ref 12–16)
IMM GRANULOCYTES # BLD AUTO: 0 X10(3) UL (ref 0–1)
IMM GRANULOCYTES NFR BLD: 0 %
LIPASE SERPL-CCNC: 75 U/L (ref 12–53)
LYMPHOCYTES # BLD AUTO: 1.48 X10(3) UL (ref 1–4)
LYMPHOCYTES NFR BLD AUTO: 35.9 %
MCH RBC QN AUTO: 30.3 PG (ref 26–34)
MCHC RBC AUTO-ENTMCNC: 35.1 G/DL (ref 31–37)
MCV RBC AUTO: 86.3 FL (ref 80–100)
MONOCYTES # BLD AUTO: 0.46 X10(3) UL (ref 0.1–1)
MONOCYTES NFR BLD AUTO: 11.2 %
NEUTROPHILS # BLD AUTO: 2.04 X10 (3) UL (ref 1.5–7.7)
NEUTROPHILS # BLD AUTO: 2.04 X10(3) UL (ref 1.5–7.7)
NEUTROPHILS NFR BLD AUTO: 49.5 %
OSMOLALITY SERPL CALC.SUM OF ELEC: 288 MOSM/KG (ref 275–295)
PLATELET # BLD AUTO: 227 10(3)UL (ref 150–450)
POTASSIUM SERPL-SCNC: 3.8 MMOL/L (ref 3.5–5.1)
PROT SERPL-MCNC: 7.7 G/DL (ref 5.7–8.2)
RBC # BLD AUTO: 4.82 X10(6)UL (ref 3.8–5.3)
SODIUM SERPL-SCNC: 138 MMOL/L (ref 136–145)
WBC # BLD AUTO: 4.1 X10(3) UL (ref 4–11)

## 2025-05-29 PROCEDURE — 96374 THER/PROPH/DIAG INJ IV PUSH: CPT

## 2025-05-29 PROCEDURE — 83690 ASSAY OF LIPASE: CPT | Performed by: STUDENT IN AN ORGANIZED HEALTH CARE EDUCATION/TRAINING PROGRAM

## 2025-05-29 PROCEDURE — 96375 TX/PRO/DX INJ NEW DRUG ADDON: CPT

## 2025-05-29 PROCEDURE — 99284 EMERGENCY DEPT VISIT MOD MDM: CPT

## 2025-05-29 PROCEDURE — 85025 COMPLETE CBC W/AUTO DIFF WBC: CPT | Performed by: STUDENT IN AN ORGANIZED HEALTH CARE EDUCATION/TRAINING PROGRAM

## 2025-05-29 PROCEDURE — 80053 COMPREHEN METABOLIC PANEL: CPT | Performed by: STUDENT IN AN ORGANIZED HEALTH CARE EDUCATION/TRAINING PROGRAM

## 2025-05-29 PROCEDURE — 96361 HYDRATE IV INFUSION ADD-ON: CPT

## 2025-05-29 RX ORDER — DIPHENHYDRAMINE HYDROCHLORIDE 50 MG/ML
25 INJECTION, SOLUTION INTRAMUSCULAR; INTRAVENOUS ONCE
Status: COMPLETED | OUTPATIENT
Start: 2025-05-29 | End: 2025-05-29

## 2025-05-29 RX ORDER — METOCLOPRAMIDE HYDROCHLORIDE 5 MG/ML
10 INJECTION INTRAMUSCULAR; INTRAVENOUS ONCE
Status: COMPLETED | OUTPATIENT
Start: 2025-05-29 | End: 2025-05-29

## 2025-05-30 ENCOUNTER — TELEPHONE (OUTPATIENT)
Dept: FAMILY MEDICINE CLINIC | Facility: CLINIC | Age: 50
End: 2025-05-30

## 2025-05-30 DIAGNOSIS — R29.818 TRANSIENT NEUROLOGICAL SYMPTOMS: ICD-10-CM

## 2025-05-30 DIAGNOSIS — R20.2 PARESTHESIAS: Primary | ICD-10-CM

## 2025-05-30 RX ORDER — SUMATRIPTAN 50 MG/1
TABLET, FILM COATED ORAL
Qty: 10 TABLET | Refills: 1 | Status: SHIPPED | OUTPATIENT
Start: 2025-05-30

## 2025-05-30 NOTE — ED QUICK NOTES
Pt went to MRI and changed her mind, wants to do the test outpatient instead where she can be sedated. MD notified.

## 2025-05-30 NOTE — ED INITIAL ASSESSMENT (HPI)
Here Saturday for abdominal pain and numbness to abdomen. Today had numbness to tongue. Numbness \"to my skin\". Speech is clear. Also no BM since Saturday

## 2025-05-30 NOTE — TELEPHONE ENCOUNTER
Recommend she try sumatriptan 50mg if needed for future episodes.   Will need MRI - but will need to be seen first before MRI can be completed because of use of sedation. PAT will require her to have medical eval and anesthesia risk assessment.   Insurance may not authorize prior to office visit.

## 2025-05-30 NOTE — TELEPHONE ENCOUNTER
Patient called requesting [facility] MRI order of brain. States she was in ED again last night and unable to complete due to panic attack/claustrophobia. ED MD stated that she needs to have the MRI scheduled at the hospital for sedation that they were unable to sedate her at the [facility] last night          Dr. Oleary - LOV 4/29/24   Are you willing to order MRI brain with sedation? She has a scheduled f/u appt. On Monday 6/2/25    ------------------------------------------------------------  Time: 05/29 8425  Comment: Patient could not tolerate MRI, refused anxiolysis reports will need to schedule under sedation due to claustrophobia.   ------------------------------------------------------------

## 2025-05-30 NOTE — ED PROVIDER NOTES
History     Chief Complaint   Patient presents with    Numbness       HPI    50 year old female presents for assessment of paresthesias.  For the past few days patient has had intermittent fluctuating intensity sensation of numbness, hypersensitivity to her right abdomen, left pelvis, right forearm, face and associated spots of the right vision.  No headache.  Has been seen in a walk-in clinic and [facility] 5/24 with negative abdominal workup.  Patient denies prior history of migraines, per chart review she has been seen for migraine-like pains, cluster headaches.  No new medicines.          Past Medical History[1]    Past Surgical History[2]    Social History     Socioeconomic History    Marital status:    Tobacco Use    Smoking status: Some Days     Current packs/day: 0.03     Types: Cigarettes    Smokeless tobacco: Never   Vaping Use    Vaping status: Never Used   Substance and Sexual Activity    Alcohol use: Not Currently    Drug use: No                   Physical Exam     ED Triage Vitals [05/29/25 2041]   /79   Pulse 93   Resp 16   Temp 97.6 °F (36.4 °C)   Temp src Temporal   SpO2 96 %   O2 Device None (Room air)       Physical Exam  Constitutional:       General: She is not in acute distress.  Eyes:      Extraocular Movements: Extraocular movements intact.      Conjunctiva/sclera: Conjunctivae normal.      Pupils: Pupils are equal, round, and reactive to light.   Cardiovascular:      Rate and Rhythm: Normal rate.      Pulses: Normal pulses.   Pulmonary:      Effort: Pulmonary effort is normal. No respiratory distress.   Abdominal:      General: Abdomen is flat. There is no distension.      Tenderness: There is no abdominal tenderness.   Musculoskeletal:      Cervical back: Normal range of motion.   Neurological:      Mental Status: She is alert.      Cranial Nerves: No cranial nerve deficit.      Motor: No weakness.      Coordination: Coordination normal.      Comments: Abnormal sensation  to the right posterior forearm, right lower abdomen, left pelvic region.  Normal sensation face.              ED Course     Labs Reviewed   COMP METABOLIC PANEL (14) - Abnormal; Notable for the following components:       Result Value    Glucose 103 (*)     Albumin 5.0 (*)     All other components within normal limits   LIPASE - Abnormal; Notable for the following components:    Lipase 75 (*)     All other components within normal limits   CBC WITH DIFFERENTIAL WITH PLATELET     No results found.        MDM     Vitals:    05/29/25 2041 05/29/25 2215   BP: 127/79 118/85   Pulse: 93 75   Resp: 16 15   Temp: 97.6 °F (36.4 °C)    TempSrc: Temporal    SpO2: 96% 100%   Weight: 77.6 kg    Height: 162.6 cm (5' 4\")        Vision changes, paresthesias without a discernible pattern, concern for MS/other demyelinating disease given neurologic symptoms  by space and time.  Differential also includes atypical complex migraine, will trial migraine cocktail, check labs and get MRI    ED Course as of 05/29/25 2325  ------------------------------------------------------------  Time: 05/29 2247  Comment: Labs are unrevealing  ------------------------------------------------------------  Time: 05/29 2323  Comment: Patient could not tolerate MRI, refused anxiolysis reports will need to schedule under sedation due to claustrophobia.   ------------------------------------------------------------  Time: 05/29 2324  Comment: On reassessment her symptoms are improved after medications.  Discussed the possibility of migrainous versus demyelinating disease, neurology referral given, discussed return precautions.  Discharged home in stable         Disposition and Plan     Clinical Impression:  1. Paresthesias    2. Visual aura        Disposition:  Discharge    Follow-up:  Talia Crouch MD  [address] [place] [id number] [phone]    Follow up        Medications Prescribed:  Current Discharge Medication List                    [1] History reviewed. No pertinent past medical history.  [2]   Past Surgical History:  Procedure Laterality Date

## 2025-06-02 ENCOUNTER — LAB ENCOUNTER (OUTPATIENT)
Dept: LAB | Age: 50
End: 2025-06-02
Attending: FAMILY MEDICINE
Payer: COMMERCIAL

## 2025-06-02 ENCOUNTER — HOSPITAL ENCOUNTER (OUTPATIENT)
Dept: CT IMAGING | Age: 50
Discharge: HOME | End: 2025-06-02
Attending: FAMILY MEDICINE
Payer: COMMERCIAL

## 2025-06-02 ENCOUNTER — OFFICE VISIT (OUTPATIENT)
Dept: FAMILY MEDICINE CLINIC | Facility: CLINIC | Age: 50
End: 2025-06-02
Payer: COMMERCIAL

## 2025-06-02 VITALS
OXYGEN SATURATION: 98 % | SYSTOLIC BLOOD PRESSURE: 110 MMHG | WEIGHT: 171 LBS | DIASTOLIC BLOOD PRESSURE: 70 MMHG | HEIGHT: 64 IN | HEART RATE: 78 BPM | BODY MASS INDEX: 29.19 KG/M2

## 2025-06-02 DIAGNOSIS — G51.0 FACIAL PARALYSIS ON RIGHT SIDE: Primary | ICD-10-CM

## 2025-06-02 DIAGNOSIS — R20.3 HYPERESTHESIA: ICD-10-CM

## 2025-06-02 DIAGNOSIS — G51.0 FACIAL PARALYSIS ON RIGHT SIDE: ICD-10-CM

## 2025-06-02 DIAGNOSIS — R10.84 GENERALIZED ABDOMINAL PAIN: ICD-10-CM

## 2025-06-02 DIAGNOSIS — M54.50 ACUTE BILATERAL LOW BACK PAIN WITHOUT SCIATICA: ICD-10-CM

## 2025-06-02 LAB
BASOPHILS # BLD AUTO: 0.04 X10(3) UL (ref 0–0.2)
BASOPHILS NFR BLD AUTO: 1.3 %
CRP SERPL-MCNC: <0.4 MG/DL (ref ?–0.5)
EOSINOPHIL # BLD AUTO: 0.07 X10(3) UL (ref 0–0.7)
EOSINOPHIL NFR BLD AUTO: 2.2 %
ERYTHROCYTE [DISTWIDTH] IN BLOOD BY AUTOMATED COUNT: 11.9 %
ERYTHROCYTE [SEDIMENTATION RATE] IN BLOOD: 17 MM/HR (ref 0–30)
EST. AVERAGE GLUCOSE BLD GHB EST-MCNC: 123 MG/DL (ref 68–126)
FOLATE SERPL-MCNC: 21.4 NG/ML (ref 5.4–?)
HBA1C MFR BLD: 5.9 % (ref ?–5.7)
HCT VFR BLD AUTO: 43.5 % (ref 35–48)
HGB BLD-MCNC: 14.9 G/DL (ref 12–16)
IMM GRANULOCYTES # BLD AUTO: 0 X10(3) UL (ref 0–1)
IMM GRANULOCYTES NFR BLD: 0 %
LYMPHOCYTES # BLD AUTO: 1.41 X10(3) UL (ref 1–4)
LYMPHOCYTES NFR BLD AUTO: 44.1 %
MCH RBC QN AUTO: 29.6 PG (ref 26–34)
MCHC RBC AUTO-ENTMCNC: 34.3 G/DL (ref 31–37)
MCV RBC AUTO: 86.3 FL (ref 80–100)
MONOCYTES # BLD AUTO: 0.4 X10(3) UL (ref 0.1–1)
MONOCYTES NFR BLD AUTO: 12.5 %
NEUTROPHILS # BLD AUTO: 1.28 X10 (3) UL (ref 1.5–7.7)
NEUTROPHILS # BLD AUTO: 1.28 X10(3) UL (ref 1.5–7.7)
NEUTROPHILS NFR BLD AUTO: 39.9 %
PLATELET # BLD AUTO: 278 10(3)UL (ref 150–450)
RBC # BLD AUTO: 5.04 X10(6)UL (ref 3.8–5.3)
T PALLIDUM AB SER QL IA: NONREACTIVE
VIT B12 SERPL-MCNC: 725 PG/ML (ref 211–911)
WBC # BLD AUTO: 3.2 X10(3) UL (ref 4–11)

## 2025-06-02 PROCEDURE — 82746 ASSAY OF FOLIC ACID SERUM: CPT | Performed by: FAMILY MEDICINE

## 2025-06-02 PROCEDURE — 82607 VITAMIN B-12: CPT | Performed by: FAMILY MEDICINE

## 2025-06-02 PROCEDURE — 85025 COMPLETE CBC W/AUTO DIFF WBC: CPT | Performed by: FAMILY MEDICINE

## 2025-06-02 PROCEDURE — 83036 HEMOGLOBIN GLYCOSYLATED A1C: CPT | Performed by: FAMILY MEDICINE

## 2025-06-02 PROCEDURE — 3074F SYST BP LT 130 MM HG: CPT | Performed by: FAMILY MEDICINE

## 2025-06-02 PROCEDURE — 85652 RBC SED RATE AUTOMATED: CPT | Performed by: FAMILY MEDICINE

## 2025-06-02 PROCEDURE — 70450 CT HEAD/BRAIN W/O DYE: CPT | Performed by: FAMILY MEDICINE

## 2025-06-02 PROCEDURE — 86140 C-REACTIVE PROTEIN: CPT | Performed by: FAMILY MEDICINE

## 2025-06-02 PROCEDURE — 3008F BODY MASS INDEX DOCD: CPT | Performed by: FAMILY MEDICINE

## 2025-06-02 PROCEDURE — 3078F DIAST BP <80 MM HG: CPT | Performed by: FAMILY MEDICINE

## 2025-06-02 PROCEDURE — 86780 TREPONEMA PALLIDUM: CPT | Performed by: FAMILY MEDICINE

## 2025-06-02 PROCEDURE — 99214 OFFICE O/P EST MOD 30 MIN: CPT | Performed by: FAMILY MEDICINE

## 2025-06-02 RX ORDER — PREDNISONE 20 MG/1
60 TABLET ORAL DAILY
Qty: 15 TABLET | Refills: 0 | Status: SHIPPED | OUTPATIENT
Start: 2025-06-02 | End: 2025-06-07

## 2025-06-02 NOTE — PROGRESS NOTES
Subjective:   Luma Trevino is a 50 year old female who presents for ER F/U (Er follow up /Patient has been having numbness and discomfort on her right side . /She said the pain is still going on and the medication the hospital gave her isnt helping/Would like an mri order /)         History/Other:   History of Present Illness  Luma Trevino is a 50 year old female who presents with facial droop, numbness, and abdominal discomfort.    Approximately one month ago, she experienced a sharp pain in her lower back that was severe enough to bring her to her knees. After resting over the weekend, she felt slightly better but remained cautious with her movements. A week later, she felt a sensation as if her organs shifted, followed by skin tenderness and difficulty with bowel movements. She also began seeing spots in her right eye and experienced itching, which she attributed to allergies.    She visited the emergency room due to discomfort and paresthesias/hyperesthesias spreading from her abdomen to her back. An ultrasound and CT scan abdomen were performed, and she was informed it was not related to her kidneys or appendicitis. She was given magnesium to relieve constipation, which helped temporarily, but she continued to experience bloating and lack of appetite. She also noted numbness on the right side of her tongue and a history of Bell's palsy, which led her to seek emergency care again.    She reports persistent hypersensitivity spreading from her abdomen to her back and right side. The sensation is described as hypersensitive, similar to touching a scar, but not painful. She has been experiencing bloating despite bowel movements and has been using 'smooth move tea' and colon health supplements to aid digestion.    She has a history of Bell's palsy on the left side and is now experiencing similar symptoms on the right side, including facial droop and difficulty controlling facial muscles. She notes increased sensitivity  in her right ear and a spot in her vision that has persisted for three days. No dizziness, but she reports itching in her eyes and face.    She stopped smoking a month ago, which she believes may have contributed to her bowel issues. She has been using magnesium oxide to aid bowel movements. She previously had an abscessed tooth treated with amoxicillin, which she stopped taking due to stomach issues. Her white blood cell count was noted to be low but has since improved.       Chief Complaint Reviewed and Verified  Nursing Notes Reviewed and   Verified  Tobacco Reviewed  Allergies Reviewed  Medications Reviewed    Problem List Reviewed  Medical History Reviewed  Surgical History   Reviewed  OB Status Reviewed  Family History Reviewed  Social History   Reviewed         Tobacco:  Tobacco Use[1]  E-Cigarettes/Vaping       Questions Responses    E-Cigarette Use Never User           Patient quit recently (less than 1 mo)       Current Medications[2]      Review of Systems:  Review of Systems   All other systems reviewed and are negative.      Objective:   /70   Pulse 78   Ht 5' 4\" (1.626 m)   Wt 171 lb (77.6 kg)   LMP  (LMP Unknown)   SpO2 98%   BMI 29.35 kg/m²  Estimated body mass index is 29.35 kg/m² as calculated from the following:    Height as of this encounter: 5' 4\" (1.626 m).    Weight as of this encounter: 171 lb (77.6 kg).  Physical Exam  Eyes:      General: Visual field deficit present.      Extraocular Movements:      Right eye: Normal extraocular motion and no nystagmus.      Left eye: Normal extraocular motion and no nystagmus.      Conjunctiva/sclera: Conjunctivae normal.   Neurological:      Cranial Nerves: Facial asymmetry present.      Motor: No weakness or tremor.      Coordination: Coordination normal. Heel to Shin Test normal.      Deep Tendon Reflexes: Reflexes are normal and symmetric. Babinski sign absent on the right side. Babinski sign absent on the left side.       Comments: Left pupil does not constrict to light source in right eye       Results  LABS  WBC: Leukopenia          Assessment & Plan:   1. Facial paralysis on right side (Primary)  -     CT BRAIN OR HEAD (CPT=70450); Future; Expected date: 06/02/2025  -     Vitamin B12; Future; Expected date: 06/02/2025  -     CBC With Differential With Platelet; Future; Expected date: 06/02/2025  -     Folic Acid Serum (Folate); Future; Expected date: 06/02/2025  -     C-Reactive Protein; Future; Expected date: 06/02/2025  -     Sed Rate, Kobiren (Automated); Future; Expected date: 06/02/2025  2. Hyperesthesia  -     T Pallidum Screening Cascade; Future; Expected date: 06/02/2025  -     Hemoglobin A1C; Future; Expected date: 06/02/2025  3. Acute bilateral low back pain without sciatica  4. Generalized abdominal pain  Other orders  -     predniSONE; Take 3 tablets (60 mg total) by mouth daily for 5 days.  Dispense: 15 tablet; Refill: 0    Assessment & Plan  Unilateral facial paralysis   Acute right-sided facial droop with inability to close right eye and drooling. Previous left-sided Bell's palsy. Differential includes Bell's palsy and other neurological conditions such as demyelination or tumor  CT scan prioritized to rule out acute intracranial bleeding or swelling.  - Prescribe prednisone for possible Bell's palsy treatment.  - Schedule MRI with sedation for further neurological evaluation.    Hypersensitivity   Hypersensitivity and numbness from abdomen to back and right side. Differential includes neurological causes such as multiple sclerosis. CT scan and MRI planned to rule out significant intracranial pathology.  - Order CT scan of the head to rule out acute intracranial pathology.  - Schedule MRI with sedation for further neurological evaluation.    Eye Itching and Spots  Persistent eye itching and floaters in right eye. Differential includes neurological causes. CT scan and MRI planned for further evaluation.  - Order  CT scan of the head to rule out acute intracranial pathology.  - Schedule MRI with sedation for further neurological evaluation.    Constipation  Chronic constipation with recent exacerbation. Smoking cessation affected bowel habits. Temporary relief with magnesium citrate. Discussed magnesium oxide and other laxatives for relief.  - Recommend magnesium oxide for constipation relief.  - Suggest use of Dulcolax or Miralax for constipation management.  - Encourage continuation of a light diet with yogurt and berries.      No follow-ups on file.      ELENA ESPARZA MD, 6/2/2025, 12:40 PM              [1]   Social History  Tobacco Use   Smoking Status Former    Types: Cigarettes   Smokeless Tobacco Never   [2]   Current Outpatient Medications   Medication Sig Dispense Refill    predniSONE 20 MG Oral Tab Take 3 tablets (60 mg total) by mouth daily for 5 days. 15 tablet 0    SUMAtriptan 50 MG Oral Tab 1-2 tablets by mouth once.  May repeat in 2 hr if needed. Max dose 200mg/24 hr 10 tablet 1    pantoprazole 40 MG Oral Tab EC Take 1 tablet (40 mg total) by mouth daily. 30 tablet 0    dicyclomine 20 MG Oral Tab Take 1 tablet (20 mg total) by mouth 4 (four) times daily as needed. 30 tablet 0

## 2025-06-02 NOTE — PROGRESS NOTES
The following individual(s) verbally consented to be recorded using ambient AI listening technology and understand that they can each withdraw their consent to this listening technology at any point by asking the clinician to turn off or pause the recording:    Patient name: Luma Trevino  Additional names:

## 2025-06-09 ENCOUNTER — TELEPHONE (OUTPATIENT)
Dept: FAMILY MEDICINE CLINIC | Facility: CLINIC | Age: 50
End: 2025-06-09

## 2025-06-09 DIAGNOSIS — Z00.00 ROUTINE GENERAL MEDICAL EXAMINATION AT A HEALTH CARE FACILITY: Primary | ICD-10-CM

## 2025-06-13 ENCOUNTER — HOSPITAL ENCOUNTER (OUTPATIENT)
Dept: GENERAL RADIOLOGY | Age: 50
Discharge: HOME | End: 2025-06-13
Attending: FAMILY MEDICINE
Payer: COMMERCIAL

## 2025-06-13 ENCOUNTER — OFFICE VISIT (OUTPATIENT)
Dept: FAMILY MEDICINE CLINIC | Facility: CLINIC | Age: 50
End: 2025-06-13
Payer: COMMERCIAL

## 2025-06-13 VITALS
BODY MASS INDEX: 28.68 KG/M2 | OXYGEN SATURATION: 99 % | WEIGHT: 168 LBS | DIASTOLIC BLOOD PRESSURE: 78 MMHG | HEIGHT: 64 IN | HEART RATE: 78 BPM | SYSTOLIC BLOOD PRESSURE: 122 MMHG

## 2025-06-13 DIAGNOSIS — R10.84 GENERALIZED ABDOMINAL PAIN: ICD-10-CM

## 2025-06-13 DIAGNOSIS — R14.0 ABDOMINAL DISTENSION (GASEOUS): ICD-10-CM

## 2025-06-13 DIAGNOSIS — Z12.31 ENCOUNTER FOR SCREENING MAMMOGRAM FOR MALIGNANT NEOPLASM OF BREAST: ICD-10-CM

## 2025-06-13 DIAGNOSIS — Z12.11 SCREENING FOR COLON CANCER: ICD-10-CM

## 2025-06-13 DIAGNOSIS — R20.8 DECREASED SENSATION: ICD-10-CM

## 2025-06-13 DIAGNOSIS — R14.0 ABDOMINAL DISTENSION (GASEOUS): Primary | ICD-10-CM

## 2025-06-13 DIAGNOSIS — G51.0 BELL'S PALSY: ICD-10-CM

## 2025-06-13 PROCEDURE — 74019 RADEX ABDOMEN 2 VIEWS: CPT | Performed by: FAMILY MEDICINE

## 2025-06-13 PROCEDURE — 3074F SYST BP LT 130 MM HG: CPT | Performed by: FAMILY MEDICINE

## 2025-06-13 PROCEDURE — 3008F BODY MASS INDEX DOCD: CPT | Performed by: FAMILY MEDICINE

## 2025-06-13 PROCEDURE — 99213 OFFICE O/P EST LOW 20 MIN: CPT | Performed by: FAMILY MEDICINE

## 2025-06-13 PROCEDURE — 3078F DIAST BP <80 MM HG: CPT | Performed by: FAMILY MEDICINE

## 2025-06-13 NOTE — PROGRESS NOTES
Subjective:   Luma Trevino is a 50 year old female who presents today is still not feeling well she said she has not eaten in two days )         History/Other:   History of Present Illness  Luma Trevino is a 50 year old female who presents with severe abdominal bloating and discomfort.    She experiences severe abdominal bloating and discomfort, describing a sensation of tightness and pressure around her abdomen, likened to a 'belt'. The discomfort is constant and takes her breath away due to bloating, but it is not acutely painful. The tightness is particularly pronounced, and her stomach feels distended and numb.    She has not been passing gas or having regular bowel movements, despite attempts to relieve symptoms with magnesium citrate, which she used twice, three weeks ago and again on Tuesday. The magnesium citrate resulted in watery stools but did not alleviate the bloating or discomfort. She has also tried dietary changes, including increased intake of fibrous foods and a juicing cleanse, but these have not resulted in consistent bowel movements. She has been trying to manage her symptoms with a liquid diet and has stopped eating solid foods due to the discomfort. Walking helps her sleep, which temporarily relieves the pain.    No blood in stool, and bowel movements, when they occur, are normal in consistency. No fever. Previous imaging studies, including a CT scan and ultrasound, were performed.    She has a history of Bell's palsy, which has improved, although she still experiences some numbness and lack of sensation. She completed a course of steroids for this condition. She also reports a burning sensation on her tongue, which she associates with nerve issues related to Bell's palsy.         Chief Complaint Reviewed and Verified  Nursing Notes Reviewed and   Verified  Tobacco Reviewed  Allergies Reviewed  Medications Reviewed    Problem List Reviewed  Medical History Reviewed  Surgical History    Reviewed  OB Status Reviewed  Family History Reviewed  Social History   Reviewed         Tobacco:  She smoked tobacco in the past but quit greater than 12 months ago.  Tobacco Use[1]     Current Medications[2]      Review of Systems:  Review of Systems   Constitutional:  Negative for chills and fever.   HENT:  Negative for rhinorrhea and sinus pressure.    Eyes:  Negative for pain and visual disturbance.   Respiratory:  Negative for cough and shortness of breath.    Cardiovascular:  Negative for chest pain and palpitations.   Gastrointestinal:  Positive for abdominal distention, abdominal pain and constipation. Negative for blood in stool, diarrhea, nausea, rectal pain and vomiting.   Genitourinary:  Negative for dysuria, frequency and urgency.   Musculoskeletal:  Negative for arthralgias and myalgias.   Skin:  Negative for pallor and rash.   Neurological:  Positive for facial asymmetry (improving) and numbness. Negative for dizziness and headaches.   Psychiatric/Behavioral:  Negative for dysphoric mood. The patient is not nervous/anxious.          Objective:   /78   Pulse 78   Ht 5' 4\" (1.626 m)   Wt 168 lb (76.2 kg)   LMP  (LMP Unknown)   SpO2 99%   BMI 28.84 kg/m²  Estimated body mass index is 28.84 kg/m² as calculated from the following:    Height as of this encounter: 5' 4\" (1.626 m).    Weight as of this encounter: 168 lb (76.2 kg).  Physical Exam  Constitutional:       General: She is not in acute distress.  Cardiovascular:      Rate and Rhythm: Normal rate and regular rhythm.   Pulmonary:      Effort: Pulmonary effort is normal.      Breath sounds: Normal breath sounds.   Abdominal:      Palpations: Abdomen is soft.      Tenderness: There is generalized abdominal tenderness. There is guarding. There is no right CVA tenderness, left CVA tenderness or rebound. Negative signs include Yi's sign.   Neurological:      Mental Status: She is alert.       Results  RADIOLOGY  CT scan: No  inflammation of the bowels, no abnormality of the bowel loops  Ultrasound: Normal contour, shape, size, and texture of the liver    Assessment & Plan:   1. Abdominal distension (gaseous) (Primary)  -     XR ABDOMEN OBSTRUCTIVE SERIES ROUTINE(2 VW)(CPT=74019); Future; Expected date: 06/13/2025  2. Generalized abdominal pain  3. Decreased sensation  4. Bell's palsy  5. Screening for colon cancer  6. Encounter for screening mammogram for malignant neoplasm of breast  -     [facility] YAIR 2D+3D SCREENING BILAT (CPT=77067/73197); Future; Expected date: 06/13/2025    Assessment & Plan  Abdominal Pain with Constipation  Chronic abdominal pain with bloating and constipation. Differential includes intestinal obstruction, IBS, or other gastrointestinal issues. CT and ultrasound showed no inflammation or obstruction. Scheduled for gastroenterology consultation. Discussed Miralax use and advised against antibiotics. Recommended liquid diet.  - Order abdominal x-ray to assess for changes since last CT scan.  - Recommend liquid diet including juices, broths, and teas.  - Advise Miralax, one dose twice daily.  - Encourage walking and light physical activity.  - Follow up with gastroenterologist on Tuesday.    Facial Paralysis (Bell's Palsy)  Improvement in facial muscle control. Completed steroids. Full recovery may take up to three months with expected 95% recovery rate.  She still has various other transient neurologic symptoms and follow up with MRI is recommended along with neuro consult     General Health Maintenance  Overdue for routine health screenings including mammogram and Cologuard test. Willing to complete screenings once abdominal issues are resolved.  - Encourage completion of mammogram and Cologuard test once abdominal issues are addressed.      Return if symptoms worsen or fail to improve.    Should return for wellness exam when better.     ELENA ESPARZA MD, 6/13/2025, 6:39 PM              [1]   Social History  Tobacco  Use   Smoking Status Former    Types: Cigarettes   Smokeless Tobacco Never   [2]   Current Outpatient Medications   Medication Sig Dispense Refill    SUMAtriptan 50 MG Oral Tab 1-2 tablets by mouth once.  May repeat in 2 hr if needed. Max dose 200mg/24 hr 10 tablet 1

## 2025-06-23 ENCOUNTER — TELEPHONE (OUTPATIENT)
Dept: FAMILY MEDICINE CLINIC | Facility: CLINIC | Age: 50
End: 2025-06-23

## 2025-06-23 NOTE — TELEPHONE ENCOUNTER
Patient reports condition update. She states she is currently taking two week course of Metronidazole. She started 9 days ago. 4 days ago she developed an itchy bumpy rash on hands, arms and torso. She states she put some vaseline on it today which is helping with the itchiness.   She also reports tingling in bilateral fingers, pinky, ring and middle fingers.   She states she is dealing with a lot of things that Dr Oleary is trying to figure out.    Routing to Dr Oleary for recommendations on tingling in fingers and rash.

## 2025-06-23 NOTE — TELEPHONE ENCOUNTER
Patient called that she got a phone call from  office stating that she is not allowed to to complete colonoscopy procedure done on 6/25/25 due to bells palsy orders of an MRI and neurology follow up. She has neurology follow up in August but patient cancelled MRI due to CT being negative. Patient asking if  can cancel MRI and neurology orders so she can get her procedure done on 6/25/25.     This patient was seen by her PCP on 6/2 who reported new \"[place] palsy\" symptoms, with drooling, right sided facial droop, and numbness. At that visit, she was referred to neurology and had an MRI brain ordered. She will need to complete the ordered follow up/testing and have it reviewed prior to receiving anesthesia at [facility]. Should she need her colon/EGD sooner, she would be safer suited within the hospital setting given her recent symptoms. Please let us know if you have any questions!  Thank you,     Queenie Snider, RN, BSN  [facility]          , patient requesting MRI and Neurology consult to be cancelled so she can proceed with colonoscopy on 6/25/25.  office informed patient she can not get procedure done due to bells palsy symptoms.

## 2025-06-23 NOTE — TELEPHONE ENCOUNTER
21 medical assists - please send addended note below to Dr. Santiago office to clear patient for colonoscopy

## 2025-06-23 NOTE — TELEPHONE ENCOUNTER
Follow up on 6/13/25 documented clinical improvement in [place] Palsy     Other vague/nonspecific paresthesias are mild and would not interfere with testing.   She also saw GI ON 6/18/25 and he noted [place] palsy had resolved.     CT head on 6/2/25 showed no lesion or mass or bleeding.   Unclear why [facility] feel she is not safe for anesthesia.     Will cancel MRI. Patient does NOT need to see neuro prior to colonoscopy

## 2025-06-23 NOTE — TELEPHONE ENCOUNTER
Spoke to patient took this am dose will hold off rest of day, advised on benadryl and hydrocortisone cream. States the upper pain under breast has gone away however is still have lower half of abdomen that wraps around to back, states she starts her prep tomorrow for her colonscopy for Wednesday and is asking if Dr. Oleary still thinks its a good idea? States the GI MD said it's ok but trusts her PCP    Dr. Oleary - pain in upper part of abdomen under R&L breast is gone, however still has constant pain on lower abdomen that wraps around. Asking if she should continue with her prep for colonpscopy tomorrow, scope is scheduled for Wednesday?

## 2025-06-23 NOTE — TELEPHONE ENCOUNTER
Spoke to patient and advised of Dr. Oleary response, states she has been applying vaseline to rash and seems to be helping

## 2025-06-23 NOTE — TELEPHONE ENCOUNTER
Spoke with patient, provided her with Dr. Oleary notes and recommendations patient verbalized understanding. Informed her our in office sent was sent a message to send a note to Dr. Santiago's office, also advised her to call their office too.

## 2025-06-23 NOTE — TELEPHONE ENCOUNTER
Is the abdominal pain and bloating better?     Did she try benadryl or zyrtec for rash?   Hold a dose of metronidazole and see if rash goes away. Use benadryl cream/lotion prn and hydrocortisone topical cream (both otc)

## 2025-06-25 PROBLEM — R10.9 ABDOMINAL PAIN: Status: ACTIVE | Noted: 2025-06-25

## 2025-06-25 PROBLEM — R14.1 ABDOMINAL GAS PAIN: Status: ACTIVE | Noted: 2025-06-25

## 2025-06-25 PROBLEM — D12.2 BENIGN NEOPLASM OF ASCENDING COLON: Status: ACTIVE | Noted: 2025-06-25

## 2025-06-25 PROBLEM — Z12.11 SPECIAL SCREENING FOR MALIGNANT NEOPLASM OF COLON: Status: ACTIVE | Noted: 2025-06-25

## 2025-06-25 PROBLEM — D12.5 BENIGN NEOPLASM OF SIGMOID COLON: Status: ACTIVE | Noted: 2025-06-25

## 2025-06-26 ENCOUNTER — TELEPHONE (OUTPATIENT)
Dept: FAMILY MEDICINE CLINIC | Facility: CLINIC | Age: 50
End: 2025-06-26

## 2025-06-26 ENCOUNTER — LAB ENCOUNTER (OUTPATIENT)
Dept: LAB | Age: 50
End: 2025-06-26
Attending: FAMILY MEDICINE
Payer: COMMERCIAL

## 2025-06-26 DIAGNOSIS — Z00.00 ROUTINE GENERAL MEDICAL EXAMINATION AT A HEALTH CARE FACILITY: ICD-10-CM

## 2025-06-26 DIAGNOSIS — R14.0 BLOATING: ICD-10-CM

## 2025-06-26 DIAGNOSIS — R10.31 RLQ ABDOMINAL PAIN: Primary | ICD-10-CM

## 2025-06-26 LAB
ALBUMIN SERPL-MCNC: 4.6 G/DL (ref 3.2–4.8)
ALBUMIN/GLOB SERPL: 1.5 {RATIO} (ref 1–2)
ALP LIVER SERPL-CCNC: 43 U/L (ref 39–100)
ALT SERPL-CCNC: 21 U/L (ref 10–49)
ANION GAP SERPL CALC-SCNC: 12 MMOL/L (ref 0–18)
AST SERPL-CCNC: 27 U/L (ref ?–34)
BASOPHILS # BLD AUTO: 0.03 X10(3) UL (ref 0–0.2)
BASOPHILS NFR BLD AUTO: 0.9 %
BILIRUB SERPL-MCNC: 0.6 MG/DL (ref 0.3–1.2)
BUN BLD-MCNC: <5 MG/DL (ref 9–23)
CALCIUM BLD-MCNC: 9.8 MG/DL (ref 8.7–10.6)
CHLORIDE SERPL-SCNC: 101 MMOL/L (ref 98–112)
CHOLEST SERPL-MCNC: 143 MG/DL (ref ?–200)
CO2 SERPL-SCNC: 26 MMOL/L (ref 21–32)
CREAT BLD-MCNC: 0.83 MG/DL (ref 0.55–1.02)
EGFRCR SERPLBLD CKD-EPI 2021: 86 ML/MIN/1.73M2 (ref 60–?)
EOSINOPHIL # BLD AUTO: 0.08 X10(3) UL (ref 0–0.7)
EOSINOPHIL NFR BLD AUTO: 2.4 %
ERYTHROCYTE [DISTWIDTH] IN BLOOD BY AUTOMATED COUNT: 12.4 %
FASTING PATIENT LIPID ANSWER: YES
FASTING STATUS PATIENT QL REPORTED: YES
GLOBULIN PLAS-MCNC: 3.1 G/DL (ref 2–3.5)
GLUCOSE BLD-MCNC: 91 MG/DL (ref 70–99)
HCT VFR BLD AUTO: 43.8 % (ref 35–48)
HDLC SERPL-MCNC: 33 MG/DL (ref 40–59)
HGB BLD-MCNC: 14.9 G/DL (ref 12–16)
IGA SERPL-MCNC: 276 MG/DL (ref 40–350)
IMM GRANULOCYTES # BLD AUTO: 0 X10(3) UL (ref 0–1)
IMM GRANULOCYTES NFR BLD: 0 %
LDLC SERPL CALC-MCNC: 95 MG/DL (ref ?–100)
LYMPHOCYTES # BLD AUTO: 1.13 X10(3) UL (ref 1–4)
LYMPHOCYTES NFR BLD AUTO: 34.3 %
MCH RBC QN AUTO: 29.2 PG (ref 26–34)
MCHC RBC AUTO-ENTMCNC: 34 G/DL (ref 31–37)
MCV RBC AUTO: 85.7 FL (ref 80–100)
MONOCYTES # BLD AUTO: 0.3 X10(3) UL (ref 0.1–1)
MONOCYTES NFR BLD AUTO: 9.1 %
NEUTROPHILS # BLD AUTO: 1.75 X10 (3) UL (ref 1.5–7.7)
NEUTROPHILS # BLD AUTO: 1.75 X10(3) UL (ref 1.5–7.7)
NEUTROPHILS NFR BLD AUTO: 53.3 %
NONHDLC SERPL-MCNC: 110 MG/DL (ref ?–130)
PLATELET # BLD AUTO: 261 10(3)UL (ref 150–450)
POTASSIUM SERPL-SCNC: 4.3 MMOL/L (ref 3.5–5.1)
PROT SERPL-MCNC: 7.7 G/DL (ref 5.7–8.2)
RBC # BLD AUTO: 5.11 X10(6)UL (ref 3.8–5.3)
SODIUM SERPL-SCNC: 139 MMOL/L (ref 136–145)
TRIGL SERPL-MCNC: 79 MG/DL (ref 30–149)
TSI SER-ACNC: 0.83 UIU/ML (ref 0.55–4.78)
VLDLC SERPL CALC-MCNC: 13 MG/DL (ref 0–30)
WBC # BLD AUTO: 3.3 X10(3) UL (ref 4–11)

## 2025-06-26 PROCEDURE — 80061 LIPID PANEL: CPT

## 2025-06-26 PROCEDURE — 86340 INTRINSIC FACTOR ANTIBODY: CPT

## 2025-06-26 PROCEDURE — 86364 TISS TRNSGLTMNASE EA IG CLAS: CPT

## 2025-06-26 PROCEDURE — 82784 ASSAY IGA/IGD/IGG/IGM EACH: CPT

## 2025-06-26 PROCEDURE — 82728 ASSAY OF FERRITIN: CPT

## 2025-06-26 PROCEDURE — 83540 ASSAY OF IRON: CPT

## 2025-06-26 PROCEDURE — 83550 IRON BINDING TEST: CPT

## 2025-06-26 PROCEDURE — 83516 IMMUNOASSAY NONANTIBODY: CPT

## 2025-06-26 PROCEDURE — 85025 COMPLETE CBC W/AUTO DIFF WBC: CPT

## 2025-06-26 PROCEDURE — 84443 ASSAY THYROID STIM HORMONE: CPT

## 2025-06-26 PROCEDURE — 36415 COLL VENOUS BLD VENIPUNCTURE: CPT

## 2025-06-26 PROCEDURE — 80053 COMPREHEN METABOLIC PANEL: CPT

## 2025-06-26 NOTE — TELEPHONE ENCOUNTER
See 6/23/25 telephone encounter.  Pt states she completed the endoscopy and colonoscopy yesterday 6/25/25. Dr. Oleary had pt on metroNIDAZOLE 500 MG Oral Tab TID, but she developed hives. She was instructed to stop medication and restart after her procedure. Dr. Santiago prescribed pantoprazole 40 MG Oral Tab EC and pt wanted to make sure it can be taken with metronidazole. Discussed with pt that there are no interactions per UpToDate. Pt wants to make sure Dr. Oleary wants her to resume antibiotic.    Pt relays that Dr. Oleary is aware of tingling in her fingers. Now, she feels the tingling in the fingertips of both hands x2 weeks. Pt asked what she can do about this or if it is related to her condition. Please advise.

## 2025-06-26 NOTE — TELEPHONE ENCOUNTER
If rash cleared after stopping metronidazole then do not restart.  There was no sign of infectious inflammation on endoscopy or colonoscopy   Please start pantoprazole rx from Dr. Santiago     Tingling of hands - ok to try amitriptyline - 10mg at bedtime   This may be cervical radiculopathy - will see if amitriptyline helps and will get further testing after 1 mo if no improvement. If tingling progresses to weakness then call back

## 2025-06-27 LAB
PARIETAL CELL AB: 146.6 UNITS
TTG IGA SER-ACNC: 0.5 U/ML (ref ?–7)

## 2025-06-27 RX ORDER — AMITRIPTYLINE HYDROCHLORIDE 10 MG/1
10 TABLET ORAL NIGHTLY
Qty: 90 TABLET | Refills: 0 | Status: SHIPPED | OUTPATIENT
Start: 2025-06-27

## 2025-06-28 LAB
DEPRECATED HBV CORE AB SER IA-ACNC: 107 NG/ML (ref 50–306)
IRON SATN MFR SERPL: 24 % (ref 15–50)
IRON SERPL-MCNC: 57 UG/DL (ref 50–170)
TOTAL IRON BINDING CAPACITY: 241 UG/DL (ref 250–425)
TRANSFERRIN SERPL-MCNC: 190 MG/DL (ref 250–380)

## 2025-06-28 NOTE — PROGRESS NOTES
Hi Luma,    Your recent blood work shows that your ferritin, which is a measure of the stores of iron in your body, is NORMAL.      Please let me know if you have any questions or concerns.     Sincerely,  Krzysztof Santiago MD

## 2025-06-28 NOTE — PROGRESS NOTES
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 We are awaiting the biopsy results from your recent EGD to better understand if this is your diagnosis.     I also recommend additional blood work, to evaluate your Vitamin B and folate, iron levels.     Please let me know if you have any questions or concerns.     Sincerely,  Krzysztof Santiago MD

## 2025-06-29 LAB — INTRINSIC FACTOR ABS: 1 AU/ML

## 2025-07-01 ENCOUNTER — LAB ENCOUNTER (OUTPATIENT)
Dept: LAB | Age: 50
End: 2025-07-01
Attending: FAMILY MEDICINE
Payer: COMMERCIAL

## 2025-07-01 ENCOUNTER — APPOINTMENT (OUTPATIENT)
Dept: LAB | Facility: HOSPITAL | Age: 50
End: 2025-07-01
Payer: COMMERCIAL

## 2025-07-01 ENCOUNTER — HOSPITAL ENCOUNTER (OUTPATIENT)
Dept: MRI IMAGING | Facility: HOSPITAL | Age: 50
Discharge: HOME | End: 2025-07-01
Attending: FAMILY MEDICINE
Payer: COMMERCIAL

## 2025-07-01 DIAGNOSIS — R10.31 ABDOMINAL PAIN, RIGHT LOWER QUADRANT: Primary | ICD-10-CM

## 2025-07-01 DIAGNOSIS — R14.0 BLOATING: ICD-10-CM

## 2025-07-01 LAB
FOLATE SERPL-MCNC: 14.8 NG/ML (ref 5.4–?)
VIT B12 SERPL-MCNC: 1532 PG/ML (ref 211–911)

## 2025-07-01 PROCEDURE — 82607 VITAMIN B-12: CPT

## 2025-07-01 PROCEDURE — 83516 IMMUNOASSAY NONANTIBODY: CPT

## 2025-07-01 PROCEDURE — 36415 COLL VENOUS BLD VENIPUNCTURE: CPT

## 2025-07-01 PROCEDURE — 82746 ASSAY OF FOLIC ACID SERUM: CPT

## 2025-07-02 LAB — PARIETAL CELL AB: 110.9 UNITS

## 2025-07-03 NOTE — PROGRESS NOTES
Hi Luma,    Your vitamin B12 level is elevated. It is OK to stop taking a Vitamin b12 supplement, if you are currently doing so. Your anti-parietal cell antibody remains elevated, and as communicated previously, is suggestive of autoimmune stomach inflammation.     Please let me know if you have any questions or concerns.     Sincerely,  Krzysztof Santiago MD

## 2025-07-11 ENCOUNTER — LAB ENCOUNTER (OUTPATIENT)
Dept: LAB | Age: 50
End: 2025-07-11
Attending: STUDENT IN AN ORGANIZED HEALTH CARE EDUCATION/TRAINING PROGRAM
Payer: COMMERCIAL

## 2025-07-11 ENCOUNTER — OFFICE VISIT (OUTPATIENT)
Dept: INTERNAL MEDICINE CLINIC | Facility: CLINIC | Age: 50
End: 2025-07-11
Payer: COMMERCIAL

## 2025-07-11 ENCOUNTER — HOSPITAL ENCOUNTER (OUTPATIENT)
Dept: GENERAL RADIOLOGY | Age: 50
Discharge: HOME | End: 2025-07-11
Attending: STUDENT IN AN ORGANIZED HEALTH CARE EDUCATION/TRAINING PROGRAM
Payer: COMMERCIAL

## 2025-07-11 VITALS
WEIGHT: 157 LBS | HEART RATE: 98 BPM | OXYGEN SATURATION: 100 % | TEMPERATURE: 98 F | HEIGHT: 64 IN | SYSTOLIC BLOOD PRESSURE: 124 MMHG | RESPIRATION RATE: 20 BRPM | BODY MASS INDEX: 26.8 KG/M2 | DIASTOLIC BLOOD PRESSURE: 70 MMHG

## 2025-07-11 DIAGNOSIS — G62.9 NEUROPATHY: Primary | ICD-10-CM

## 2025-07-11 DIAGNOSIS — K59.09 OTHER CONSTIPATION: ICD-10-CM

## 2025-07-11 DIAGNOSIS — G62.9 NEUROPATHY: ICD-10-CM

## 2025-07-11 LAB
CRP SERPL-MCNC: <0.5 MG/DL (ref ?–0.5)
ERYTHROCYTE [SEDIMENTATION RATE] IN BLOOD: 19 MM/HR (ref 0–30)
RHEUMATOID FACT SERPL-ACNC: <3.5 IU/ML (ref ?–14)
URATE SERPL-MCNC: 4.8 MG/DL (ref 3.1–7.8)

## 2025-07-11 PROCEDURE — 86140 C-REACTIVE PROTEIN: CPT

## 2025-07-11 PROCEDURE — 86225 DNA ANTIBODY NATIVE: CPT

## 2025-07-11 PROCEDURE — 84550 ASSAY OF BLOOD/URIC ACID: CPT

## 2025-07-11 PROCEDURE — 85652 RBC SED RATE AUTOMATED: CPT

## 2025-07-11 PROCEDURE — 36415 COLL VENOUS BLD VENIPUNCTURE: CPT

## 2025-07-11 PROCEDURE — 86038 ANTINUCLEAR ANTIBODIES: CPT

## 2025-07-11 PROCEDURE — 74018 RADEX ABDOMEN 1 VIEW: CPT | Performed by: STUDENT IN AN ORGANIZED HEALTH CARE EDUCATION/TRAINING PROGRAM

## 2025-07-11 PROCEDURE — 86431 RHEUMATOID FACTOR QUANT: CPT

## 2025-07-11 PROCEDURE — 86200 CCP ANTIBODY: CPT

## 2025-07-11 NOTE — PATIENT INSTRUCTIONS
Ingredients:   One 8.3-ounce bottle of MiraLAX® (238 grams) or generic equivalent.  Two 32-ounce bottles of Gatorade®. (NOT RED).    Mix 1/2 of MiraLAX bottle (119 grams) in each 32-ounce Gatorade bottle until dissolved. Keep cool in the refrigerator. DO NOT ADD ICE. DO NOT USE RED GATORADE.     Drink first bottle of Gatorade within 1-2 hours and expect to be running to the bathroom all day. So do this on a light day where you will be home and close to the bathroom. If within 6-8 hours your stool is not clear fluid, then please drink the other bottle of Gatorade (half to full). Do not eat any solid food while doing this bowel cleanse. Just make sure you are well hydrated and drink lots of water. After this you should be feeling better and having solid stools again. If you are having dizziness or lightheadedness, increased bleeding, or just generally not feeling well, please call the clinic. We are on call over the weekend too.     Dr. Omari MD  Internal Medicine     VISIT SUMMARY:  During your visit, we discussed your ongoing symptoms of abdominal bloating, pain, and numbness. We reviewed your history of Bell's palsy and gastritis, and the recent tests and treatments you have undergone. We have developed a comprehensive plan to address your symptoms and manage your conditions.    YOUR PLAN:  -ATROPHIC GASTRITIS: Atrophic gastritis is a condition where the stomach lining is inflamed and thinned, often due to autoimmune factors. You should continue taking pantoprazole for 8 weeks, follow up with Dr. Santiago, consider a gluten-free diet, avoid spicy and citrusy foods, avoid bread and pasta, and try Beano for gas relief and prune juice or Miralax for constipation. Continue pantoprazole as prescribed.     -SMALL INTESTINAL BACTERIAL OVERGROWTH (SIBO): SIBO is a condition where excess bacteria grow in the small intestine, causing bloating and discomfort. You should follow up with Dr. Santiago for further evaluation and consider  a breath test for SIBO.    -PERIPHERAL NEUROPATHY: Peripheral neuropathy is a condition where the nerves outside the brain and spinal cord are damaged, causing numbness and tingling. We will order an MICAELA panel for an autoimmune workup, refer you to a rheumatologist, schedule an EMG study with a neurologist, and keep your neurologist appointment in August. You were prescribed by your PCP amitriptyline which may help with your neuropathy.     -BELL'S PALSY: Bell's palsy is a condition that causes sudden weakness or paralysis on one side of the face. Your recent episode resolved with steroids, and it may be related to an autoimmune process or inflammation.    -GENERAL HEALTH MAINTENANCE: To manage your symptoms, continue with a bland diet including oatmeal, apples, cucumbers, and bananas. Consider a gluten-free diet, avoid spicy and citrusy foods, and avoid bread and pasta.    INSTRUCTIONS:  Please follow up with Dr. Santiago, the GI specialist, and schedule an appointment with a rheumatologist. Keep your neurologist appointment in August and follow up with your OB GYN for IUD management.    Contains text generated by Gerardo

## 2025-07-11 NOTE — PROGRESS NOTES
CHIEF COMPLAINT:   Chief Complaint   Patient presents with    New Patient    Pain     Ongoing issue nerve pain numbness states x 3 months discomfort on her right side tingling feeling both hands,right side toes,  ,back and shoulder         HPI:   Luma Trevino is a 50 year old female who presents for a second opinion regarding nerve issues.     Wt Readings from Last 6 Encounters:   07/11/25 157 lb (71.2 kg)   06/18/25 168 lb (76.2 kg)   06/13/25 168 lb (76.2 kg)   06/02/25 171 lb (77.6 kg)   05/29/25 171 lb (77.6 kg)   05/29/25 171 lb 1.2 oz (77.6 kg)     Body mass index is 26.95 kg/m².     History of Present Illness  Luma Trevino is a 50 year old female with a history of Bell's palsy and gastritis who presents with abdominal bloating, pain, and numbness.    In early May, she began experiencing discomfort and bloating, initially managed with over-the-counter colon health supplements. She then developed a shooting pain in the middle of her back, severe enough to drop her to her knees, which subsided by the weekend with ibuprofen. Following this, she experienced significant abdominal bloating and constipation, using Smooth Move tea with minimal relief.    Two weeks after the onset of symptoms, she noticed numbness on the right side of her abdomen, extending from her hip. This numbness was not accompanied by tingling but felt 'weird' on her skin. She sought care at urgent care, suspecting shingles, but was told it was unlikely due to the absence of a rash. Subsequent hospital evaluations, including an ultrasound and CT scan, revealed only loose pockets of stool, with no abnormalities in the gallbladder, kidneys, liver, or pancreas.    She was prescribed pantoprazole and magnesium citrate, which did not alleviate her bloating. Her abdominal distension worsened, making her appear 'ten months pregnant,' and she experienced constant pain and spreading numbness, now involving her back and both sides of her abdomen. The pain is  described as ultra-sensitive, akin to 'the worst sunburn ever,' and is exacerbated by clothing and showering.    She underwent an upper GI and colonoscopy on June 25th, with biopsies and blood work performed. She was told she had gastritis after her upper GI and colonoscopy and was prescribed pantoprazole to be taken 30 minutes before breakfast, but she reports no improvement in symptoms. She also experienced elevated B12 levels after supplementation, which she has since stopped.    Her symptoms have expanded to include tingling in her hands and toes, primarily on the right side, and she continues to experience significant bloating. Her stomach is so distended that she avoids touching her skin, and bathing feels like 'a bunch of punches on my back.'    She has a history of Bell's palsy, which recurred three weeks ago, presenting with tingling on the right side of her face and mouth tightness. She was treated with steroids and reports resolution of symptoms within two to three weeks. She previously experienced Bell's palsy 20 years ago after her first child.    She has been on a liquid diet and has tried various dietary modifications, including avoiding coffee, fast foods, and adhering to a bland diet with broths, oatmeal, apples, cucumbers, and bananas. Despite these efforts, she continues to struggle with bloating and constipation, with limited relief from Smooth Move tea and other natural remedies.    She works in an administrative role, has had an IUD for 20 years, and is experiencing excessive sweating managed with baby powder. No rash, chest pain except associated with gas, or cyclical changes suggestive of menopause.       Current Medications[1]   Past Medical History[2]   Past Surgical History[3]   Family History[4]   Social History:   Short Social Hx on File[5]     REVIEW OF SYSTEMS:   Negative except for what is mentioned in HPI.     Screenings:   1.    2.    3.    4.    5.    6.    7.    8.    9.              EXAM:   /70 (BP Location: Right arm, Patient Position: Sitting, Cuff Size: adult)   Pulse 98   Temp 97.9 °F (36.6 °C) (Temporal)   Resp 20   Ht 5' 4\" (1.626 m)   Wt 157 lb (71.2 kg)   LMP  (LMP Unknown)   SpO2 100%   BMI 26.95 kg/m²   Body mass index is 26.95 kg/m².   GENERAL: well developed, well nourished,in no apparent distress  SKIN: no rashes,no suspicious lesions  GI: Visible bloating noted.  NEURO: No signs of facial drooping. Oriented times three. Motor intact. Notable tenderness to palpation of the upper and lower extremity. No weakness noted.     Physical Exam      Labs:   Lab Results   Component Value Date/Time    WBC 3.3 (L) 06/26/2025 10:28 AM    HGB 14.9 06/26/2025 10:28 AM    .0 06/26/2025 10:28 AM      Lab Results   Component Value Date/Time    GLU 91 06/26/2025 10:28 AM     06/26/2025 10:28 AM    K 4.3 06/26/2025 10:28 AM     06/26/2025 10:28 AM    CO2 26.0 06/26/2025 10:28 AM    CREATSERUM 0.83 06/26/2025 10:28 AM    CA 9.8 06/26/2025 10:28 AM    ALB 4.6 06/26/2025 10:28 AM    TP 7.7 06/26/2025 10:28 AM    ALKPHO 43 06/26/2025 10:28 AM    AST 27 06/26/2025 10:28 AM    ALT 21 06/26/2025 10:28 AM    BILT 0.6 06/26/2025 10:28 AM    TSH 0.826 06/26/2025 10:28 AM        Lab Results   Component Value Date/Time    CHOLEST 143 06/26/2025 10:28 AM    HDL 33 (L) 06/26/2025 10:28 AM    TRIG 79 06/26/2025 10:28 AM    LDL 95 06/26/2025 10:28 AM    NONHDLC 110 06/26/2025 10:28 AM       Lab Results   Component Value Date/Time    A1C 5.9 (H) 06/02/2025 12:40 PM      Lab Results   Component Value Date    VITD 17.9 (L) 04/29/2024         Imaging:   XR ABDOMEN OBSTRUCTIVE SERIES ROUTINE(2 VW)(CPT=74019)  Result Date: 6/13/2025  CONCLUSION:  Bowel gas pattern is unremarkable.  Mild amount of stool throughout the colon.   LOCATION:  [facility]    Dictated by (CST): Rakan Gamez MD on 6/13/2025 at 8:57 PM     Finalized by (CST): Rakan Gamez MD on 6/13/2025 at 8:57 PM        CT BRAIN OR HEAD (CPT=70450)  Result Date: 6/2/2025  CONCLUSION:  No significant midline shift or mass effect.  No acute intracranial hemorrhage.  If there is persistent clinical concern then consider MRI.     LOCATION:  [id number]   Dictated by (CST): Dustin Paiz MD on 6/02/2025 at 3:51 PM     Finalized by (CST): Dustin Paiz MD on 6/02/2025 at 3:55 PM       CT ABDOMEN+PELVIS(CONTRAST ONLY)(CPT=74177)  Result Date: 5/24/2025  CONCLUSION:   1. No acute intra-abdominal/pelvic process identified.  2. Minimal nonspecific free fluid in the pelvis may be physiologic.  3. 2.1 cm probable functional cyst in the right ovary.  4. 4 mm noncalcified pulmonary nodule in the partially imaged right lower lobe. Followup as per Fleischner criteria is suggested.  Please see above for further details.  The findings include a single, incidentally detected, solid pulmonary nodule, measuring less than 6mm.  2017 guidelines from the Fleischner Society for the follow-up and management of incidentally detected indeterminate pulmonary nodules in persons at least 35 years of age depend on nodule size (average length and width) and underlying risk factors (including smoking and other risk factors). Please consider the following recommendations after clinical assessment of risk factors.  For <6mm solid nodules: In low risk patients, no follow-up required.  If suspicious morphology or upper lobe location, consider 12 month follow-up.  In high risk patients, optional CT in 12 months.      LOCATION:  [facility]   Dictated by (CST): Caleb Sawyer MD on 5/24/2025 at 12:41 PM     Finalized by (CST): Caleb Sawyer MD on 5/24/2025 at 12:45 PM       US ABDOMEN COMPLETE (CPT=76700)  Result Date: 5/24/2025  CONCLUSION:  Unremarkable abdominal ultrasound.   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 Symptoms include bloating and discomfort. Minimal improvement with pantoprazole. Possible autoimmune etiology. Further evaluation by GI specialist and potential rheumatology consultation recommended.  - Continue pantoprazole as recommended by Dr. Santiago  - Follow up with GI specialist Dr. Santiago  - Consider gluten-free diet  - Avoid spicy and citrusy foods  - Try Beano for gas relief  - Try prune juice and Miralax for constipation    Small Intestinal Bacterial Overgrowth (SIBO)  Previously treated with metronidazole, alleviating some abdominal pain but not resolving bloating. Further testing, including breath test, recommended.  - Follow up with GI specialist for further evaluation  - Consider breath test for SIBO    Peripheral Neuropathy  Numbness and tingling on the right side, including arms, back, and toes, with nerve pain. Symptoms spreading, atypical for nerve impingement, suggesting possible peripheral neuropathy. Potential autoimmune contribution. EMG study and MICAELA panel recommended.  - Order MICAELA panel for autoimmune workup  - Refer to rheumatologist for further evaluation  - Schedule EMG study with neurologist  - Keep neurologist appointment in August    Stewart's Palsy  Bell's palsy three weeks ago with right cheek tingling and facial muscle tightness, resolved with steroids. Possible relation to autoimmune process or inflammation.    General Health Maintenance  Managing diet to alleviate symptoms, avoiding coffee, fast foods, and adhering to a bland diet. Gluten-free diet may benefit autoimmune patients.  - Continue bland diet with oatmeal, apples, cucumbers, bananas  - Consider gluten-free diet  - Avoid spicy and citrusy foods  - Avoid bread and pasta    Follow-up  Requires follow-up with multiple specialists for comprehensive management of complex symptoms and conditions.  - Follow up with GI  specialist Dr. Berman  - Schedule appointment with rheumatologist  - Keep neurologist appointment in August  - Follow up with OB GYN for IUD management    Recording duration: 35 minutes    Return in about 4 weeks (around 2025) for Dr. Oleary in 4 weeks .    Heide Salcido MD   Internal Medicine          The following individual(s) verbally consented to be recorded using ambient AI listening technology and understand that they can each withdraw their consent to this listening technology at any point by asking the clinician to turn off or pause the recording:    Patient name: Luma rTevino               [1]   Current Outpatient Medications   Medication Sig Dispense Refill    pantoprazole 40 MG Oral Tab EC Take one tablet (40 mg total) by mouth once daily, 30 minutes prior to breakfast. 90 tablet 0    PEG 3350-KCl-Na Bicarb-NaCl 420 g Oral Recon Soln Take as directed by physician. 4000 mL 0    SUMAtriptan 50 MG Oral Tab 1-2 tablets by mouth once.  May repeat in 2 hr if needed. Max dose 200mg/24 hr 10 tablet 1   [2]   Past Medical History:   Abdominal distention    Abdominal pain    Bloating   [3]   Past Surgical History:  Procedure Laterality Date         [4]   Family History  Problem Relation Age of Onset    Diabetes Maternal Grandmother     Diabetes Maternal Grandfather     Diabetes Paternal Grandmother     Diabetes Paternal Grandfather    [5]   Social History  Socioeconomic History    Marital status:    Tobacco Use    Smoking status: Former     Types: Cigarettes    Smokeless tobacco: Never   Vaping Use    Vaping status: Never Used   Substance and Sexual Activity    Alcohol use: Not Currently    Drug use: No

## 2025-07-14 LAB
CCP IGG SERPL-ACNC: 1.9 U/ML (ref 0–6.9)
DSDNA IGG SERPL IA-ACNC: 1.9 IU/ML (ref ?–10)
ENA AB SER QL IA: 0.4 UG/L (ref ?–0.7)
ENA AB SER QL IA: NEGATIVE

## 2025-07-29 ENCOUNTER — OFFICE VISIT (OUTPATIENT)
Dept: FAMILY MEDICINE CLINIC | Facility: CLINIC | Age: 50
End: 2025-07-29

## 2025-07-29 VITALS
SYSTOLIC BLOOD PRESSURE: 110 MMHG | DIASTOLIC BLOOD PRESSURE: 70 MMHG | HEIGHT: 64 IN | OXYGEN SATURATION: 98 % | WEIGHT: 154 LBS | HEART RATE: 110 BPM | BODY MASS INDEX: 26.29 KG/M2

## 2025-07-29 DIAGNOSIS — G62.9 NEUROPATHY: Primary | ICD-10-CM

## 2025-07-29 DIAGNOSIS — R29.818 TRANSIENT NEUROLOGICAL SYMPTOMS: ICD-10-CM

## 2025-07-29 DIAGNOSIS — R14.1 ABDOMINAL GAS PAIN: ICD-10-CM

## 2025-07-29 DIAGNOSIS — F40.240 CLAUSTROPHOBIA: ICD-10-CM

## 2025-07-29 PROCEDURE — 3008F BODY MASS INDEX DOCD: CPT | Performed by: FAMILY MEDICINE

## 2025-07-29 PROCEDURE — 3074F SYST BP LT 130 MM HG: CPT | Performed by: FAMILY MEDICINE

## 2025-07-29 PROCEDURE — 99214 OFFICE O/P EST MOD 30 MIN: CPT | Performed by: FAMILY MEDICINE

## 2025-07-29 PROCEDURE — 3078F DIAST BP <80 MM HG: CPT | Performed by: FAMILY MEDICINE

## 2025-07-29 RX ORDER — DIAZEPAM 5 MG/1
5 TABLET ORAL EVERY 6 HOURS PRN
Qty: 15 TABLET | Refills: 0 | Status: SHIPPED | OUTPATIENT
Start: 2025-07-29

## 2025-07-29 RX ORDER — DIAZEPAM 5 MG/1
5 TABLET ORAL EVERY 6 HOURS PRN
Qty: 2 TABLET | Refills: 0 | Status: SHIPPED | OUTPATIENT
Start: 2025-07-29 | End: 2025-07-29

## 2025-08-01 ENCOUNTER — TELEPHONE (OUTPATIENT)
Dept: FAMILY MEDICINE CLINIC | Facility: CLINIC | Age: 50
End: 2025-08-01

## 2025-08-05 ENCOUNTER — HOSPITAL ENCOUNTER (OUTPATIENT)
Dept: CT IMAGING | Facility: HOSPITAL | Age: 50
Discharge: HOME | End: 2025-08-05
Attending: STUDENT IN AN ORGANIZED HEALTH CARE EDUCATION/TRAINING PROGRAM

## 2025-08-05 DIAGNOSIS — K29.40 AUTOIMMUNE GASTRITIS: ICD-10-CM

## 2025-08-05 LAB
CREAT BLD-MCNC: 0.8 MG/DL (ref 0.55–1.02)
EGFRCR SERPLBLD CKD-EPI 2021: 90 ML/MIN/1.73M2 (ref 60–?)

## 2025-08-05 PROCEDURE — 74177 CT ABD & PELVIS W/CONTRAST: CPT | Performed by: STUDENT IN AN ORGANIZED HEALTH CARE EDUCATION/TRAINING PROGRAM

## 2025-08-05 PROCEDURE — 82565 ASSAY OF CREATININE: CPT

## 2025-08-14 ENCOUNTER — OFFICE VISIT (OUTPATIENT)
Dept: NEUROLOGY | Facility: CLINIC | Age: 50
End: 2025-08-14

## 2025-08-14 VITALS
WEIGHT: 153 LBS | SYSTOLIC BLOOD PRESSURE: 128 MMHG | RESPIRATION RATE: 16 BRPM | OXYGEN SATURATION: 98 % | DIASTOLIC BLOOD PRESSURE: 70 MMHG | BODY MASS INDEX: 26 KG/M2 | HEART RATE: 110 BPM

## 2025-08-14 DIAGNOSIS — R20.0 NUMBNESS: Primary | ICD-10-CM

## 2025-08-14 PROCEDURE — 3074F SYST BP LT 130 MM HG: CPT | Performed by: OTHER

## 2025-08-14 PROCEDURE — 3078F DIAST BP <80 MM HG: CPT | Performed by: OTHER

## 2025-08-14 PROCEDURE — 99204 OFFICE O/P NEW MOD 45 MIN: CPT | Performed by: OTHER

## 2025-08-25 ENCOUNTER — HOSPITAL ENCOUNTER (OUTPATIENT)
Dept: MRI IMAGING | Facility: HOSPITAL | Age: 50
Discharge: HOME | End: 2025-08-25
Attending: FAMILY MEDICINE

## 2025-08-25 DIAGNOSIS — G62.9 NEUROPATHY: ICD-10-CM

## 2025-08-25 DIAGNOSIS — R29.818 TRANSIENT NEUROLOGICAL SYMPTOMS: ICD-10-CM

## 2025-08-25 PROCEDURE — 70553 MRI BRAIN STEM W/O & W/DYE: CPT | Performed by: FAMILY MEDICINE

## 2025-08-25 PROCEDURE — A9575 INJ GADOTERATE MEGLUMI 0.1ML: HCPCS | Performed by: FAMILY MEDICINE

## 2025-08-25 RX ORDER — GADOTERATE MEGLUMINE 376.9 MG/ML
15 INJECTION INTRAVENOUS
Status: COMPLETED | OUTPATIENT
Start: 2025-08-25 | End: 2025-08-25

## 2025-08-25 RX ADMIN — GADOTERATE MEGLUMINE 14 ML: 376.9 INJECTION INTRAVENOUS at 07:25:00

## (undated) NOTE — LETTER
01/01/19        [address]na [address] [place] [id number]      Dear [place],    [facility] records indicate that you have outstanding lab work and or testing that was ordered for you and has not yet been completed:  Orders Placed This Encounter      He

## (undated) NOTE — ED AVS SNAPSHOT
Karrie Larson   MRN: [id number]    Department:  THE [facility] Emergency Department in [place]   Date of Visit:  10/6/2018           Disclosure     Insurance plans vary and the physician(s) referred by the ER may not be covered by your plan.  Please contact yo tell this physician (or your personal doctor if your instructions are to return to your personal doctor) about any new or lasting problems. The primary care or specialist physician will see patients referred from the [facility] Emergency Department.  Severo Pastures

## (undated) NOTE — LETTER
09/30/18        [place] [id number]      Dear Jordan Flower,    [facility] records indicate that you have outstanding lab work and or testing that was ordered for you and has not yet been completed:  Orders Placed This Encounter

## (undated) NOTE — LETTER
12/18/18        [place] [id number]      Dear Renee [place],    [facility] records indicate that you have outstanding lab work and or testing that was ordered for you and has not yet been completed:  Orders Placed This Encounter      MA